# Patient Record
Sex: MALE | Race: WHITE | NOT HISPANIC OR LATINO | Employment: FULL TIME | URBAN - METROPOLITAN AREA
[De-identification: names, ages, dates, MRNs, and addresses within clinical notes are randomized per-mention and may not be internally consistent; named-entity substitution may affect disease eponyms.]

---

## 2018-01-18 ENCOUNTER — OFFICE VISIT - HEALTHEAST (OUTPATIENT)
Dept: FAMILY MEDICINE | Facility: CLINIC | Age: 43
End: 2018-01-18

## 2018-01-18 DIAGNOSIS — F41.1 GENERALIZED ANXIETY DISORDER: ICD-10-CM

## 2018-01-18 DIAGNOSIS — F41.0 PANIC ANXIETY SYNDROME: ICD-10-CM

## 2018-01-18 LAB
ANION GAP SERPL CALCULATED.3IONS-SCNC: 10 MMOL/L (ref 5–18)
BUN SERPL-MCNC: 18 MG/DL (ref 8–22)
CALCIUM SERPL-MCNC: 9.8 MG/DL (ref 8.5–10.5)
CHLORIDE BLD-SCNC: 105 MMOL/L (ref 98–107)
CO2 SERPL-SCNC: 26 MMOL/L (ref 22–31)
CREAT SERPL-MCNC: 0.9 MG/DL (ref 0.7–1.3)
GFR SERPL CREATININE-BSD FRML MDRD: >60 ML/MIN/1.73M2
GLUCOSE BLD-MCNC: 91 MG/DL (ref 70–125)
POTASSIUM BLD-SCNC: 4.4 MMOL/L (ref 3.5–5)
SODIUM SERPL-SCNC: 141 MMOL/L (ref 136–145)
TSH SERPL DL<=0.005 MIU/L-ACNC: 0.86 UIU/ML (ref 0.3–5)

## 2018-01-18 NOTE — ASSESSMENT & PLAN NOTE
This patient returns to clinic to discuss anxiety.  This is the first time he is ever discussed this health concern with a physician.  He does meet criteria for generalized anxiety disorder.  There are some features consistent with agoraphobia as well as panic attack syndrome.   -I am recommending both pharmacologic and psychologic therapy.  Start Lexapro with initial target dose of 10 mg.  Referral for cognitive behavioral therapy.  -Basic lab work, check thyroid.  -There is some situational anxiety that has features consistent with performance anxiety.  A trial of propranolol to be used as needed.  -No indication for benzodiazepines or antihistamines.  -Return to clinic in 4 weeks for follow-up visit to discuss improvement in side effects.

## 2018-01-25 ENCOUNTER — COMMUNICATION - HEALTHEAST (OUTPATIENT)
Dept: FAMILY MEDICINE | Facility: CLINIC | Age: 43
End: 2018-01-25

## 2018-02-06 ENCOUNTER — COMMUNICATION - HEALTHEAST (OUTPATIENT)
Dept: FAMILY MEDICINE | Facility: CLINIC | Age: 43
End: 2018-02-06

## 2018-03-07 ENCOUNTER — COMMUNICATION - HEALTHEAST (OUTPATIENT)
Dept: FAMILY MEDICINE | Facility: CLINIC | Age: 43
End: 2018-03-07

## 2018-03-26 ENCOUNTER — OFFICE VISIT - HEALTHEAST (OUTPATIENT)
Dept: FAMILY MEDICINE | Facility: CLINIC | Age: 43
End: 2018-03-26

## 2018-03-26 DIAGNOSIS — J30.2 SEASONAL ALLERGIES: ICD-10-CM

## 2018-03-26 DIAGNOSIS — F41.0 PANIC ANXIETY SYNDROME: ICD-10-CM

## 2018-03-26 DIAGNOSIS — F41.1 GENERALIZED ANXIETY DISORDER: ICD-10-CM

## 2018-03-26 NOTE — ASSESSMENT & PLAN NOTE
This patient returns for a 2 month visit after starting Lexapro in treatment of generalized anxiety disorder with some mild symptoms of panic and some mild symptoms agoraphobia.  Overall, the patient presents himself is doing quite well today.  He believes the medication has been effective.  He has expressed some side effects which she has been navigating and does not identify as being a reason to stop the medication.  -I did encourage the patient to consider psychotherapy if he is interested in increased treatment.  -Return to clinic in 6 months.

## 2018-04-30 ENCOUNTER — COMMUNICATION - HEALTHEAST (OUTPATIENT)
Dept: FAMILY MEDICINE | Facility: CLINIC | Age: 43
End: 2018-04-30

## 2018-09-17 ENCOUNTER — COMMUNICATION - HEALTHEAST (OUTPATIENT)
Dept: FAMILY MEDICINE | Facility: CLINIC | Age: 43
End: 2018-09-17

## 2018-09-26 ENCOUNTER — OFFICE VISIT - HEALTHEAST (OUTPATIENT)
Dept: FAMILY MEDICINE | Facility: CLINIC | Age: 43
End: 2018-09-26

## 2018-09-26 DIAGNOSIS — F41.0 PANIC ANXIETY SYNDROME: ICD-10-CM

## 2018-09-26 DIAGNOSIS — F41.1 GENERALIZED ANXIETY DISORDER: ICD-10-CM

## 2018-09-26 DIAGNOSIS — Z13.1 SCREENING FOR DIABETES MELLITUS: ICD-10-CM

## 2018-09-26 DIAGNOSIS — Z13.220 SCREENING FOR LIPID DISORDERS: ICD-10-CM

## 2018-09-26 NOTE — ASSESSMENT & PLAN NOTE
Controlled.  Reviewed the PHQ 9 and MAYUR 7 is shown in the flowsheet.  I recommend that the patient continue his current medication without change.  Propranolol was refilled for performance anxiety related to presentations at work.  Return to clinic in 6 months for annual exam and mental health follow-up.  Fasting laboratory tests were ordered in anticipation of this visit.

## 2019-09-09 ENCOUNTER — OFFICE VISIT - HEALTHEAST (OUTPATIENT)
Dept: FAMILY MEDICINE | Facility: CLINIC | Age: 44
End: 2019-09-09

## 2019-09-09 DIAGNOSIS — J30.2 SEASONAL ALLERGIES: ICD-10-CM

## 2019-09-09 DIAGNOSIS — Z00.00 ROUTINE GENERAL MEDICAL EXAMINATION AT A HEALTH CARE FACILITY: ICD-10-CM

## 2019-09-09 DIAGNOSIS — Z23 NEED FOR VACCINATION: ICD-10-CM

## 2019-09-09 DIAGNOSIS — F41.0 PANIC ANXIETY SYNDROME: ICD-10-CM

## 2019-09-09 DIAGNOSIS — F41.1 GENERALIZED ANXIETY DISORDER: ICD-10-CM

## 2019-09-09 ASSESSMENT — ANXIETY QUESTIONNAIRES
3. WORRYING TOO MUCH ABOUT DIFFERENT THINGS: SEVERAL DAYS
7. FEELING AFRAID AS IF SOMETHING AWFUL MIGHT HAPPEN: NOT AT ALL
4. TROUBLE RELAXING: SEVERAL DAYS
2. NOT BEING ABLE TO STOP OR CONTROL WORRYING: SEVERAL DAYS
GAD7 TOTAL SCORE: 5
5. BEING SO RESTLESS THAT IT IS HARD TO SIT STILL: NOT AT ALL
IF YOU CHECKED OFF ANY PROBLEMS ON THIS QUESTIONNAIRE, HOW DIFFICULT HAVE THESE PROBLEMS MADE IT FOR YOU TO DO YOUR WORK, TAKE CARE OF THINGS AT HOME, OR GET ALONG WITH OTHER PEOPLE: SOMEWHAT DIFFICULT
6. BECOMING EASILY ANNOYED OR IRRITABLE: SEVERAL DAYS
1. FEELING NERVOUS, ANXIOUS, OR ON EDGE: SEVERAL DAYS

## 2019-09-09 ASSESSMENT — PATIENT HEALTH QUESTIONNAIRE - PHQ9: SUM OF ALL RESPONSES TO PHQ QUESTIONS 1-9: 3

## 2019-09-09 ASSESSMENT — MIFFLIN-ST. JEOR: SCORE: 1919.32

## 2019-09-09 NOTE — ASSESSMENT & PLAN NOTE
This patient presents for annual exam without specific concerns other than medication check.  Sleep has been somewhat dysregulated recently.  Based on our conversation, this is likely multifactorial with contributing factors including marital strife, stress at work.  However, he does not associate this with increased anxiety.  - I have recommended fasting lab work.  Phenotypically, he describes some symptoms which could be consistent with metabolic syndrome.  We do not have a fasting cholesterol panel nor screening for diabetes.  - He is up-to-date with preventative health factors except for influenza vaccination which she is declining today.  -Return toclinic in 6 months for anxiety focus visit.

## 2019-09-09 NOTE — ASSESSMENT & PLAN NOTE
Currently well controlled.  Early awakening insomnia is unlikely to be a result of his Lexapro.  Continue exercise.  Discussed sleep hygiene.

## 2019-09-19 ENCOUNTER — AMBULATORY - HEALTHEAST (OUTPATIENT)
Dept: LAB | Facility: CLINIC | Age: 44
End: 2019-09-19

## 2019-09-19 DIAGNOSIS — Z00.00 ROUTINE GENERAL MEDICAL EXAMINATION AT A HEALTH CARE FACILITY: ICD-10-CM

## 2019-09-19 DIAGNOSIS — F41.1 GENERALIZED ANXIETY DISORDER: ICD-10-CM

## 2019-09-19 DIAGNOSIS — Z13.1 SCREENING FOR DIABETES MELLITUS: ICD-10-CM

## 2019-09-19 DIAGNOSIS — Z13.220 SCREENING FOR LIPID DISORDERS: ICD-10-CM

## 2019-09-19 LAB
ANION GAP SERPL CALCULATED.3IONS-SCNC: 9 MMOL/L (ref 5–18)
BUN SERPL-MCNC: 17 MG/DL (ref 8–22)
CALCIUM SERPL-MCNC: 9.6 MG/DL (ref 8.5–10.5)
CHLORIDE BLD-SCNC: 105 MMOL/L (ref 98–107)
CHOLEST SERPL-MCNC: 151 MG/DL
CO2 SERPL-SCNC: 27 MMOL/L (ref 22–31)
CREAT SERPL-MCNC: 0.95 MG/DL (ref 0.7–1.3)
FASTING STATUS PATIENT QL REPORTED: YES
GFR SERPL CREATININE-BSD FRML MDRD: >60 ML/MIN/1.73M2
GLUCOSE BLD-MCNC: 104 MG/DL (ref 70–125)
HDLC SERPL-MCNC: 54 MG/DL
LDLC SERPL CALC-MCNC: 85 MG/DL
POTASSIUM BLD-SCNC: 4.5 MMOL/L (ref 3.5–5)
SODIUM SERPL-SCNC: 141 MMOL/L (ref 136–145)
TRIGL SERPL-MCNC: 61 MG/DL

## 2020-03-09 ENCOUNTER — COMMUNICATION - HEALTHEAST (OUTPATIENT)
Dept: FAMILY MEDICINE | Facility: CLINIC | Age: 45
End: 2020-03-09

## 2020-03-09 DIAGNOSIS — F41.1 GENERALIZED ANXIETY DISORDER: ICD-10-CM

## 2020-06-13 ENCOUNTER — COMMUNICATION - HEALTHEAST (OUTPATIENT)
Dept: FAMILY MEDICINE | Facility: CLINIC | Age: 45
End: 2020-06-13

## 2020-06-13 DIAGNOSIS — F41.1 GENERALIZED ANXIETY DISORDER: ICD-10-CM

## 2020-09-18 ENCOUNTER — COMMUNICATION - HEALTHEAST (OUTPATIENT)
Dept: FAMILY MEDICINE | Facility: CLINIC | Age: 45
End: 2020-09-18

## 2020-09-18 DIAGNOSIS — F41.1 GENERALIZED ANXIETY DISORDER: ICD-10-CM

## 2020-12-21 ENCOUNTER — COMMUNICATION - HEALTHEAST (OUTPATIENT)
Dept: FAMILY MEDICINE | Facility: CLINIC | Age: 45
End: 2020-12-21

## 2020-12-21 DIAGNOSIS — F41.1 GENERALIZED ANXIETY DISORDER: ICD-10-CM

## 2021-03-22 ENCOUNTER — COMMUNICATION - HEALTHEAST (OUTPATIENT)
Dept: FAMILY MEDICINE | Facility: CLINIC | Age: 46
End: 2021-03-22

## 2021-03-22 DIAGNOSIS — F41.1 GENERALIZED ANXIETY DISORDER: ICD-10-CM

## 2021-03-25 ENCOUNTER — OFFICE VISIT - HEALTHEAST (OUTPATIENT)
Dept: FAMILY MEDICINE | Facility: CLINIC | Age: 46
End: 2021-03-25

## 2021-03-25 DIAGNOSIS — I71.20 THORACIC AORTIC ANEURYSM WITHOUT RUPTURE (H): ICD-10-CM

## 2021-03-25 DIAGNOSIS — Z13.220 SCREENING FOR LIPID DISORDERS: ICD-10-CM

## 2021-03-25 DIAGNOSIS — R73.01 ELEVATED FASTING GLUCOSE: ICD-10-CM

## 2021-03-25 DIAGNOSIS — Z00.00 ROUTINE GENERAL MEDICAL EXAMINATION AT A HEALTH CARE FACILITY: ICD-10-CM

## 2021-03-25 DIAGNOSIS — F41.1 GENERALIZED ANXIETY DISORDER: ICD-10-CM

## 2021-03-25 DIAGNOSIS — J30.2 SEASONAL ALLERGIES: ICD-10-CM

## 2021-03-25 LAB
CHOLEST SERPL-MCNC: 152 MG/DL
FASTING STATUS PATIENT QL REPORTED: YES
FASTING STATUS PATIENT QL REPORTED: YES
GLUCOSE BLD-MCNC: 81 MG/DL (ref 70–99)
HBA1C MFR BLD: 5.3 %
HDLC SERPL-MCNC: 49 MG/DL
LDLC SERPL CALC-MCNC: 77 MG/DL
TRIGL SERPL-MCNC: 129 MG/DL

## 2021-03-25 RX ORDER — LORATADINE PSEUDOEPHEDRINE SULFATE 10; 240 MG/1; MG/1
1 TABLET, EXTENDED RELEASE ORAL DAILY
Qty: 90 TABLET | Refills: 1 | Status: SHIPPED | OUTPATIENT
Start: 2021-03-25 | End: 2022-06-30

## 2021-03-25 RX ORDER — ESCITALOPRAM OXALATE 10 MG/1
10 TABLET ORAL DAILY
Qty: 90 TABLET | Refills: 3 | Status: SHIPPED | OUTPATIENT
Start: 2021-03-25 | End: 2022-06-16

## 2021-03-25 ASSESSMENT — PATIENT HEALTH QUESTIONNAIRE - PHQ9: SUM OF ALL RESPONSES TO PHQ QUESTIONS 1-9: 2

## 2021-03-25 ASSESSMENT — ANXIETY QUESTIONNAIRES
IF YOU CHECKED OFF ANY PROBLEMS ON THIS QUESTIONNAIRE, HOW DIFFICULT HAVE THESE PROBLEMS MADE IT FOR YOU TO DO YOUR WORK, TAKE CARE OF THINGS AT HOME, OR GET ALONG WITH OTHER PEOPLE: SOMEWHAT DIFFICULT
GAD7 TOTAL SCORE: 3
6. BECOMING EASILY ANNOYED OR IRRITABLE: SEVERAL DAYS
3. WORRYING TOO MUCH ABOUT DIFFERENT THINGS: SEVERAL DAYS
5. BEING SO RESTLESS THAT IT IS HARD TO SIT STILL: NOT AT ALL
7. FEELING AFRAID AS IF SOMETHING AWFUL MIGHT HAPPEN: NOT AT ALL
4. TROUBLE RELAXING: NOT AT ALL
1. FEELING NERVOUS, ANXIOUS, OR ON EDGE: SEVERAL DAYS
2. NOT BEING ABLE TO STOP OR CONTROL WORRYING: NOT AT ALL

## 2021-03-25 ASSESSMENT — MIFFLIN-ST. JEOR: SCORE: 1980.55

## 2021-03-25 NOTE — ASSESSMENT & PLAN NOTE
This patient is up-to-date with preventative health recommendations.  We will plan to check hepatitis C/HIV at a future visit.  Fasting lab work will be performed today.  No new/additional family history.

## 2021-03-25 NOTE — ASSESSMENT & PLAN NOTE
Overall, the patient is doing well.  No specific concerns as it relates to mental health/anxiety.  No side effects from his current medication.  He is working from home which has been a challenge.  Otherwise, status quo as relates to his medication plan.  Check basic metabolic panel today.

## 2021-03-25 NOTE — ASSESSMENT & PLAN NOTE
The patient presented to a local emergency department last week with acute chest pain and a sensation of it radiating through to the back.  The evaluation for aortic dissection showed a mild dilatation of the ascending thoracic aorta with some concern for aneurysm.  There is no comparison.  He otherwise feels well.  The chest pain that brought him to the emergency department ultimately seems to have been musculoskeletal in nature.  We discussed that we would need to evaluate for change with follow-up scan at some point in the future.  This will likely take place usingCT angiogram as this was the initial modality.  Blood pressures well controlled.  We discussed that he should avoid fluoroquinolones.  He does not seem to have genetic predisposition with a condition such as Marfan's or Shalonda-Danlos syndrome.  He is comfortable with surveillance strategy.  At this time, we will not refer him to a specialist for additional recommendations/evaluation.

## 2021-04-18 ENCOUNTER — COMMUNICATION - HEALTHEAST (OUTPATIENT)
Dept: FAMILY MEDICINE | Facility: CLINIC | Age: 46
End: 2021-04-18

## 2021-04-18 DIAGNOSIS — F41.1 GENERALIZED ANXIETY DISORDER: ICD-10-CM

## 2021-04-19 RX ORDER — PROPRANOLOL HYDROCHLORIDE 40 MG/1
40 TABLET ORAL PRN
Qty: 90 TABLET | Refills: 3 | Status: SHIPPED | OUTPATIENT
Start: 2021-04-19 | End: 2022-06-30

## 2021-05-26 ASSESSMENT — PATIENT HEALTH QUESTIONNAIRE - PHQ9: SUM OF ALL RESPONSES TO PHQ QUESTIONS 1-9: 3

## 2021-05-27 ASSESSMENT — PATIENT HEALTH QUESTIONNAIRE - PHQ9: SUM OF ALL RESPONSES TO PHQ QUESTIONS 1-9: 2

## 2021-05-28 ASSESSMENT — ANXIETY QUESTIONNAIRES
GAD7 TOTAL SCORE: 3
GAD7 TOTAL SCORE: 5

## 2021-05-31 VITALS — BODY MASS INDEX: 27.57 KG/M2 | WEIGHT: 209 LBS

## 2021-06-01 VITALS — WEIGHT: 210.7 LBS | BODY MASS INDEX: 27.8 KG/M2

## 2021-06-02 VITALS — BODY MASS INDEX: 28.63 KG/M2 | WEIGHT: 217 LBS

## 2021-06-03 VITALS
DIASTOLIC BLOOD PRESSURE: 70 MMHG | BODY MASS INDEX: 29.03 KG/M2 | TEMPERATURE: 98.6 F | RESPIRATION RATE: 16 BRPM | OXYGEN SATURATION: 98 % | WEIGHT: 219 LBS | HEART RATE: 70 BPM | HEIGHT: 73 IN | SYSTOLIC BLOOD PRESSURE: 126 MMHG

## 2021-06-05 VITALS
TEMPERATURE: 98.3 F | SYSTOLIC BLOOD PRESSURE: 130 MMHG | HEART RATE: 50 BPM | BODY MASS INDEX: 29.39 KG/M2 | OXYGEN SATURATION: 98 % | DIASTOLIC BLOOD PRESSURE: 74 MMHG | HEIGHT: 74 IN | WEIGHT: 229 LBS | RESPIRATION RATE: 18 BRPM

## 2021-06-06 NOTE — TELEPHONE ENCOUNTER
Refill Approved    Rx renewed per Medication Renewal Policy. Medication was last renewed on 9.9.19.    Kristal Dobbins, [facility] Triage/Med Refill 3/12/2020     Requested Prescriptions   Pending Prescriptions Disp Refills     escitalopram oxalate (LEXAPRO) 10 MG tablet [Pharmacy Med Name: ESCITALOPRAM 10 MG TABLET] 90 tablet 1     Sig: TAKE 1 TABLET BY MOUTH EVERY DAY       [facility] Refill Protocol  Passed - 3/9/2020  2:14 AM        Passed - PCP or prescribing provider visit in last year     Last office visit with prescriber/PCP: 9/26/2018 Sree Martin MD OR same dept: Visit date not found OR same specialty: 9/26/2018 Sree Martin MD  Last physical: 9/9/2019 Last MTM visit: Visit date not found   Next visit within 3 mo: Visit date not found  Next physical within 3 mo: Visit date not found  Prescriber OR PCP: Sree Martin MD  Last diagnosis associated with med order: 1. Generalized anxiety disorder  - escitalopram oxalate (LEXAPRO) 10 MG tablet [Pharmacy Med Name: ESCITALOPRAM 10 MG TABLET]; TAKE 1 TABLET BY MOUTH EVERY DAY  Dispense: 90 tablet; Refill: 1    If protocol passes may refill for 12 months if within 3 months of last provider visit (or a total of 15 months).

## 2021-06-08 NOTE — TELEPHONE ENCOUNTER
Refill Approved    Rx renewed per Medication Renewal Policy. Medication was last renewed on 3/12/20.    Natacha Griffin, [facility] Triage/Med Refill 6/15/2020     Requested Prescriptions   Pending Prescriptions Disp Refills     escitalopram oxalate (LEXAPRO) 10 MG tablet [Pharmacy Med Name: ESCITALOPRAM 10 MG TABLET] 90 tablet 0     Sig: TAKE 1 TABLET BY MOUTH EVERY DAY       [facility] Refill Protocol  Passed - 6/13/2020 12:15 AM        Passed - PCP or prescribing provider visit in last year     Last office visit with prescriber/PCP: 9/26/2018 Sree Martin MD OR same dept: Visit date not found OR same specialty: 9/26/2018 Sree Martin MD  Last physical: 9/9/2019 Last MTM visit: Visit date not found   Next visit within 3 mo: Visit date not found  Next physical within 3 mo: Visit date not found  Prescriber OR PCP: Sree Martin MD  Last diagnosis associated with med order: 1. Generalized anxiety disorder  - escitalopram oxalate (LEXAPRO) 10 MG tablet [Pharmacy Med Name: ESCITALOPRAM 10 MG TABLET]; TAKE 1 TABLET BY MOUTH EVERY DAY  Dispense: 90 tablet; Refill: 0    If protocol passes may refill for 12 months if within 3 months of last provider visit (or a total of 15 months).

## 2021-06-11 NOTE — TELEPHONE ENCOUNTER
Left message to call back for: Cordell   Information to relay to patient:  See message below and assist in scheduling for a physical when he calls back    Thank you,  Sindy Conteh LPN

## 2021-06-11 NOTE — TELEPHONE ENCOUNTER
Left message to call back for: Cordell   Information to relay to patient:  See message below and schedule an appointment when he calls back    Sindy Conteh LPN

## 2021-06-14 NOTE — TELEPHONE ENCOUNTER
RN cannot approve Refill Request    RN can NOT refill this medication Protocol failed and NO refill given. Last office visit: 9/26/2018 Sree Martin MD Last Physical: 9/9/2019 Last MTM visit: Visit date not found Last visit same specialty: 9/26/2018 Sree Martin MD.  Next visit within 3 mo: Visit date not found  Next physical within 3 mo: Visit date not found      Natacha Griffin, [facility] Connection Triage/Med Refill 12/23/2020    Requested Prescriptions   Pending Prescriptions Disp Refills     escitalopram oxalate (LEXAPRO) 10 MG tablet [Pharmacy Med Name: ESCITALOPRAM 10 MG TABLET] 90 tablet 0     Sig: TAKE 1 TABLET BY MOUTH EVERY DAY       [facility] Refill Protocol  Failed - 12/21/2020 12:12 AM        Failed - PCP or prescribing provider visit in last year     Last office visit with prescriber/PCP: 9/26/2018 Sree Martin MD OR same dept: Visit date not found OR same specialty: 9/26/2018 Sree Martin MD  Last physical: 9/9/2019 Last MTM visit: Visit date not found   Next visit within 3 mo: Visit date not found  Next physical within 3 mo: Visit date not found  Prescriber OR PCP: Sree Martin MD  Last diagnosis associated with med order: 1. Generalized anxiety disorder  - escitalopram oxalate (LEXAPRO) 10 MG tablet [Pharmacy Med Name: ESCITALOPRAM 10 MG TABLET]; TAKE 1 TABLET BY MOUTH EVERY DAY  Dispense: 90 tablet; Refill: 0    If protocol passes may refill for 12 months if within 3 months of last provider visit (or a total of 15 months).

## 2021-06-15 NOTE — PROGRESS NOTES
"Assessment/Plan:    Problem List Items Addressed This Visit     Generalized anxiety disorder - Primary     This patient returns to clinic to discuss anxiety.  This is the first time he is ever discussed this health concern with a physician.  He does meet criteria for generalized anxiety disorder.  There are some features consistent with agoraphobia as well as panic attack syndrome.   -I am recommending both pharmacologic and psychologic therapy.  Start Lexapro with initial target dose of 10 mg.  Referral for cognitive behavioral therapy.  -Basic lab work, check thyroid.  -There is some situational anxiety that has features consistent with performance anxiety.  A trial of propranolol to be used as needed.  -No indication for benzodiazepines or antihistamines.  -Return to clinic in 4 weeks for follow-up visit to discuss improvement in side effects.         Relevant Medications    escitalopram oxalate (LEXAPRO) 10 MG tablet    Other Relevant Orders    Thyroid Stimulating Hormone (TSH)    Basic Metabolic Panel    Ambulatory referral to Psychology    Panic anxiety syndrome    Relevant Medications    escitalopram oxalate (LEXAPRO) 10 MG tablet        Return in about 4 weeks (around 2/15/2018) for Anxiety.    Sree Martin MD  _______________________________    Chief Complaint   Patient presents with     Anxiety     Subjective: Cordell Ng is a 42 y.o. year old male who returns to clinic for the following chronic complaints/concerns:     Anxiety:   - started 10+ years ago.   - worsening.  Affecting life more.  He will avoid situations that cause anxiety.     - job:  at [employer]. Social job.  Worse on the job.  \"Mild to panic.\"  He will have sweats and tightness in chest as his anxiety increases.  \"Fight through it.\"  This makes it worse.     - he has tried natural \"calming\" things.  5-htp.  \"Not working.\"   - He perseverates about the process of traveling.   - people in his space cause problems.  " "Proximity.   - triggers are variable.     - he is frustrated by the lack of control.     - poor appetite.   - relationships: wife thinks that she has enabled him but doing simple tasks that he should do for himself.  Wife asked me to do this.\"  - has not been interacting with friends as much.      Review of systems is negative except for as shown in the HPI.    The following portions of the patient's history were reviewed and updated as appropriate: allergies, current medications, past family history, past medical history, past social history, past surgical history and problem list.    Objective:    weight is 209 lb (94.8 kg). His blood pressure is 138/80 and his pulse is 60.   General: No acute distress  Eyes: No conjunctival injection, no scleral icterus.  ENT: No submandibular anterior cervical lymphadenopathy.  No thyromegaly.  Next line cardiac: Regular rate and rhythm, normal S1/S2, no murmurs gallops  Respiratory: Clear to auscultation bilaterally.    Neurologic: Cranial nerves II through XII grossly intact.  Walks with normal gait.  Psych: Patient appears somewhat anxious.  Normal rate of speech.  No tangentiality.  No perseveration.  No suicidality.  Thinking is logical.  Neatly dressed.    No results found for this or any previous visit (from the past 24 hour(s)).    Additional History from Old Records Summarized (2): no  Decision to Obtain Records (1): no  Radiology Tests Summarized or Ordered (1): no  Labs Reviewed or Ordered (1): yes  Medicine Test Summarized or Ordered (1): no  Independent Review of EKG or X-RAY(2 each): no    This note has been dictated using voice recognition software. Any grammatical or context distortions are unintentional and inherent to the software  "

## 2021-06-16 PROBLEM — R73.01 ELEVATED FASTING GLUCOSE: Status: ACTIVE | Noted: 2019-09-24

## 2021-06-16 PROBLEM — F41.1 GENERALIZED ANXIETY DISORDER: Status: ACTIVE | Noted: 2018-01-18

## 2021-06-16 PROBLEM — Z00.00 ROUTINE GENERAL MEDICAL EXAMINATION AT A HEALTH CARE FACILITY: Status: ACTIVE | Noted: 2019-09-09

## 2021-06-16 PROBLEM — I71.20 THORACIC AORTIC ANEURYSM WITHOUT RUPTURE (H): Status: ACTIVE | Noted: 2021-03-25

## 2021-06-16 PROBLEM — F41.0 PANIC ANXIETY SYNDROME: Status: ACTIVE | Noted: 2018-01-18

## 2021-06-16 NOTE — PROGRESS NOTES
"Assessment/Plan:    Problem List Items Addressed This Visit     Generalized anxiety disorder - Primary     This patient returns for a 2 month visit after starting Lexapro in treatment of generalized anxiety disorder with some mild symptoms of panic and some mild symptoms agoraphobia.  Overall, the patient presents himself is doing quite well today.  He believes the medication has been effective.  He has expressed some side effects which she has been navigating and does not identify as being a reason to stop the medication.  -I did encourage the patient to consider psychotherapy if he is interested in increased treatment.  -Return to clinic in 6 months.         Relevant Medications    escitalopram oxalate (LEXAPRO) 10 MG tablet    Panic anxiety syndrome    Relevant Medications    escitalopram oxalate (LEXAPRO) 10 MG tablet      Other Visit Diagnoses     Seasonal allergies        Relevant Medications    loratadine-pseudoephedrine (CLARITIN-D 24 HOUR)  mg per 24 hr tablet        No Follow-up on file.    Sree Martin MD  _______________________________    Chief Complaint   Patient presents with     Anxiety     Medication Education Visit     pt would like to discuss RX for claritin D      Subjective: Cordell Ng is a 43 y.o. year old male who returns to clinic for the following chronic complaints/concerns:     Anxiety:   - he has seen improvement.   - more able to function.   - has not started therapy.   - \"improved.\"  Less agoraphobia symptoms.  Example: wife had surgery which required him to be more active on a day to today basis.     - some initial side effects that resolved.     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     - he believes his libido is a bit lower than before.    - he has not used the propranolol.  \"Stomach issues.\"      Review of systems is negative except for as shown in the HPI.    The following portions of the patient's history were reviewed and updated as appropriate: " allergies, current medications, past family history, past medical history, past social history, past surgical history and problem list.    Objective:    weight is 210 lb 11.2 oz (95.6 kg). His blood pressure is 136/86 and his pulse is 66.   General: No acute distress  Psych: Affect is somewhat flat.  Normal rate of speech.  No tangentiality.  Denies suicidality.  Thinking is logical.  Response time is normal.  Neatly dressed.    Reviewed the MAYUR 7 is shown in the flowsheet.    No results found for this or any previous visit (from the past 24 hour(s)).    Additional History from Old Records Summarized (2): no  Decision to Obtain Records (1): no  Radiology Tests Summarized or Ordered (1): no  Labs Reviewed or Ordered (1): no  Medicine Test Summarized or Ordered (1): no  Independent Review of EKG or X-RAY(2 each): no    This note has been dictated using voice recognition software. Any grammatical or context distortions are unintentional and inherent to the software

## 2021-06-16 NOTE — TELEPHONE ENCOUNTER
Refill Approved    Rx renewed per Medication Renewal Policy. Medication was last renewed on 3/25/21.    Turner Jose, [facility] Triage/Med Refill 4/19/2021     Requested Prescriptions   Pending Prescriptions Disp Refills     propranoloL (INDERAL) 40 MG tablet [Pharmacy Med Name: PROPRANOLOL 40 MG TABLET] 30 tablet 0     Sig: TAKE 1 TABLET (40 MG TOTAL) BY MOUTH AS NEEDED.       Beta-Blockers Refill Protocol Passed - 4/18/2021  9:31 AM        Passed - PCP or prescribing provider visit in past 12 months or next 3 months     Last office visit with prescriber/PCP: 9/26/2018 Sree Martin MD OR same dept: Visit date not found OR same specialty: 9/26/2018 Sree Martin MD  Last physical: 3/25/2021 Last MTM visit: Visit date not found   Next visit within 3 mo: Visit date not found  Next physical within 3 mo: Visit date not found  Prescriber OR PCP: Sree Martin MD  Last diagnosis associated with med order: 1. Generalized anxiety disorder  - propranoloL (INDERAL) 40 MG tablet [Pharmacy Med Name: PROPRANOLOL 40 MG TABLET]; Take 1 tablet (40 mg total) by mouth as needed.  Dispense: 30 tablet; Refill: 0    If protocol passes may refill for 12 months if within 3 months of last provider visit (or a total of 15 months).             Passed - Blood pressure filed in past 12 months     BP Readings from Last 1 Encounters:   03/25/21 130/74

## 2021-06-16 NOTE — TELEPHONE ENCOUNTER
RN cannot approve Refill Request    RN can NOT refill this medication PCP messaged that patient is overdue for Office Visit. Last office visit: 9/26/2018 Sree Martin MD Last Physical: 9/9/2019 Last MTM visit: Visit date not found Last visit same specialty: 9/26/2018 Sree Martin MD.  Next visit within 3 mo: Visit date not found  Next physical within 3 mo: Visit date not found      Vivian Sykes, [facility] Triage/Med Refill 3/22/2021    Requested Prescriptions   Pending Prescriptions Disp Refills     escitalopram oxalate (LEXAPRO) 10 MG tablet [Pharmacy Med Name: ESCITALOPRAM 10 MG TABLET] 90 tablet 0     Sig: TAKE 1 TABLET (10 MG TOTAL) BY MOUTH DAILY. CLINIC VISIT REQUIRED BEFORE ADDITIONAL REFILLS       [facility] Refill Protocol  Failed - 3/22/2021 12:21 AM        Failed - PCP or prescribing provider visit in last year     Last office visit with prescriber/PCP: 9/26/2018 Sree Martin MD OR same dept: Visit date not found OR same specialty: 9/26/2018 Sree Martin MD  Last physical: 9/9/2019 Last MTM visit: Visit date not found   Next visit within 3 mo: Visit date not found  Next physical within 3 mo: Visit date not found  Prescriber OR PCP: Sree Martin MD  Last diagnosis associated with med order: 1. Generalized anxiety disorder  - escitalopram oxalate (LEXAPRO) 10 MG tablet [Pharmacy Med Name: ESCITALOPRAM 10 MG TABLET]; Take 1 tablet (10 mg total) by mouth daily. Clinic visit required before additional refills  Dispense: 90 tablet; Refill: 0    If protocol passes may refill for 12 months if within 3 months of last provider visit (or a total of 15 months).

## 2021-06-17 NOTE — PATIENT INSTRUCTIONS - HE
Patient Instructions by Sree Martin MD at 9/9/2019  1:40 PM     Author: rSee Martin MD Service: -- Author Type: Physician    Filed: 9/9/2019  2:21 PM Encounter Date: 9/9/2019 Status: Addendum    : Sree Martin MD (Physician)    Related Notes: Original Note by Sree Martin MD (Physician) filed at 9/9/2019  1:58 PM        - Google: sleep hygiene (American Sleep Association)   - Try Melatonin  (5 mg 1-2 hours before bedtime)   - mindfulness.   - Cognitive Behavioral Therapy for Insomnia.     Bear Walden, PhD   - Why We Sleep    Sleep Hygiene Tips - Research & Treatments  Getting good sleep is important in maintaining health. There are several things that you can do to promote good sleep and sleep hygiene, and ultimately Get Better Sleep.  What is sleep hygiene?  Sleep hygiene is defined as behaviors that one can do to help promote good sleep using behavioral interventions.  Sleep hygiene tips:  Maintain a regular sleep routine  Go to bed at the same time. Wake up at the same time. Ideally, your schedule will remain the same (+/- 20 minutes) every night of the week.  Avoid naps if possible  Naps decrease the Sleep Debt that is so necessary for easy sleep onset.   Each of us needs a certain amount of sleep per 24-hour period. We need that amount, and we dont need more than that.   When we take naps, it decreases the amount of sleep that we need the next night - which may cause sleep fragmentation and difficulty initiating sleep, and may lead to insomnia.  Dont stay in bed awake for more than 5-10 minutes.  If you find your mind racing, or worrying about not being able to sleep during the middle of the night, get out of bed, and sit in a chair in the dark. Do your mind racing in the chair until you are sleepy, then return to bed. No TV or internet during these periods! That will just stimulate you more than desired.  If this happens several times during the night, that is OK. Just  maintain your regular wake time, and try to avoid naps.  Dont watch TV or read in bed.  When you watch TV or read in bed, you associate the bed with wakefulness.   The bed is reserved for two things - sleep and hanky panky.  Drink caffeinated drinks with caution  The effects of caffeine may last for several hours after ingestion. Caffeine can fragment sleep, and cause difficulty initiating sleep. If you drink caffeine, use it only before noon.   Remember that soda and tea contain caffeine as well.     Avoid inappropriate substances that interfere with sleep  Cigarettes, alcohol, and over-the-counter medications may cause fragmented sleep.  Exercise regularly  Exercise before 2 pm every day. Exercise promotes continuous sleep.   Avoid rigorous exercise before bedtime. Rigorous exercise circulates endorphins into the body which may cause difficulty initiating sleep.   exercise and sleep  Have a quiet, comfortable bedroom  Set your bedroom thermostat at a comfortable temperature. Generally, a little cooler is better than a little warmer.   Turn off the TV and other extraneous noise that may disrupt sleep. Background white noise like a fan is OK.   If your pets awaken you, keep them outside the bedroom.   Your bedroom should be dark. Turn off bright lights.   Have a comfortable mattress.  If you are a clock watcher at night, hide the clock.    Have a comfortable pre-bedtime routine  A warm bath, shower   Meditation, or quiet time  Some who are struggling with sleep regularly find it helpful to print out these recommendations and read them regularly. If you accidentally miss some of recommendations, or have a bad night, do not fret. By following these sleep hygiene recommendations, you will help yourself to get into a routine that promotes good sleep opportunities.

## 2021-06-20 NOTE — PROGRESS NOTES
"Assessment/Plan:    Problem List Items Addressed This Visit     Generalized anxiety disorder - Primary     Controlled.  Reviewed the PHQ 9 and MAYUR 7 is shown in the flowsheet.  I recommend that the patient continue his current medication without change.  Propranolol was refilled for performance anxiety related to presentations at work.  Return to clinic in 6 months for annual exam and mental health follow-up.  Fasting laboratory tests were ordered in anticipation of this visit.         Relevant Medications    propranolol (INDERAL) 40 MG tablet    escitalopram oxalate (LEXAPRO) 10 MG tablet    Panic anxiety syndrome    Relevant Medications    escitalopram oxalate (LEXAPRO) 10 MG tablet      Other Visit Diagnoses     Screening for lipid disorders        Relevant Orders    Lipid Profile    Screening for diabetes mellitus        Relevant Orders    Glucose          No Follow-up on file.    Sree Martin MD  _______________________________    Chief Complaint   Patient presents with     Follow-up     anxiety      Subjective: Cordell Ng is a 43 y.o. year old male who returns to clinic for the following chronic complaints/concerns:     Anxiety:   - no dramatic changes   - propranolol helps with presentations    - on a day-to-day basis he is doing well.   - he tends to continue to avoid crowds.  Symptoms of agoraphobia have improved.   - no side effects from lexapro.  \"Odd feeling\" has resolved.  Sleep remains variable.     -he works out regularly.   - no sexual side effects (delayed ejaculation has resolved).    Review of systems is negative except for as shown in the HPI.    The following portions of the patient's history were reviewed and updated as appropriate: allergies, current medications, past medical history, past social history and problem list.    Objective:    weight is 217 lb (98.4 kg). His blood pressure is 126/80 and his pulse is 54 (abnormal).   Gen: nad  Psych: normal affect.      No results found for " this or any previous visit (from the past 24 hour(s)).    Additional History from Old Records Summarized (2): no  Decision to Obtain Records (1): no  Radiology Tests Summarized or Ordered (1): no  Labs Reviewed or Ordered (1): no  Medicine Test Summarized or Ordered (1): no  Independent Review of EKG or X-RAY(2 each): no    This note has been dictated using voice recognition software. Any grammatical or context distortions are unintentional and inherent to the software

## 2021-06-27 ENCOUNTER — HEALTH MAINTENANCE LETTER (OUTPATIENT)
Age: 46
End: 2021-06-27

## 2021-06-28 NOTE — PROGRESS NOTES
Progress Notes by Sree Martin MD at 9/9/2019  1:40 PM     Author: Sree Martin MD Service: -- Author Type: Physician    Filed: 9/9/2019  3:56 PM Encounter Date: 9/9/2019 Status: Signed    : Sree Martin MD (Physician)       MALE PREVENTATIVE EXAM    Assessment and Plan:       Problem List Items Addressed This Visit     Generalized anxiety disorder     Currently well controlled.  Early awakening insomnia is unlikely to be a result of his Lexapro.  Continue exercise.  Discussed sleep hygiene.         Relevant Medications    escitalopram oxalate (LEXAPRO) 10 MG tablet    Other Relevant Orders    Basic Metabolic Panel    Panic anxiety syndrome    Relevant Medications    escitalopram oxalate (LEXAPRO) 10 MG tablet    Routine general medical examination at a health care facility - Primary     This patient presents for annual exam without specific concerns other than medication check.  Sleep has been somewhat dysregulated recently.  Based on our conversation, this is likely multifactorial with contributing factors including marital strife, stress at work.  However, he does not associate this with increased anxiety.  - I have recommended fasting lab work.  Phenotypically, he describes some symptoms which could be consistent with metabolic syndrome.  We do not have a fasting cholesterol panel nor screening for diabetes.  - He is up-to-date with preventative health factors except for influenza vaccination which she is declining today.  -Return to clinic in 6 months for anxiety focus visit.         Relevant Medications    loratadine-pseudoephedrine (CLARITIN-D 24 HOUR)  mg per 24 hr tablet    Other Relevant Orders    Basic Metabolic Panel    BMI 29.0-29.9,adult      Other Visit Diagnoses     Need for vaccination        Relevant Orders    Tdap vaccine,  6yo or older,  IM (Completed)    Seasonal allergies        Relevant Medications    loratadine-pseudoephedrine (CLARITIN-D 24 HOUR)  mg per  24 hr tablet        Next follow up:  Return in about 6 months (around 3/9/2020) for Anxiety - virtual visit or phone call.    Immunization Review  Adult Imm Review: Missing doses of TDAP   Pt does not use tobacco products   I discussed the following with the patient:   Adult Healthy Living: Importance of regular exercise  Healthy nutrition  Getting adequate sleep  Stress management    I have had an Advance Directives discussion with the patient.    Subjective:   Chief Complaint: Cordell Ng is an 44 y.o. male here for a preventative health visit.     HPI:      Sleep: frequent wakening. More tossing and turning.  Most nights.  He does not think that this is related to lexapro.  Anxiety has been stable.  More situational scenarios.  Ongoing maritial strife.  He lays in bed and tosses/turns.  He does not get up.  He does fall back asleep.  Early in night is affected.  No alcohol.      Healthy Habits  Are you taking a daily aspirin? No  Do you typically exercising at least 40 min, 3-4 times per week?  Yes (free weights and cardio).  Do you usually eat at least 4 servings of fruit and vegetables a day, include whole grains and fiber and avoid regularly eating high fat foods? NO.  Mix of foods.  Red meat twice per week.  Other forms of protein.  Lots of veggies.  Breads including white bread.  He eats three full meals and snacks. He grazes.    Have you had an eye exam in the past two years? Yes  Do you see a dentist twice per year? Yes  Do you have any concerns regarding sleep? YES    Safety Screen  If you own firearms, are they secured in a locked gun cabinet or with trigger locks? Yes  Do you feel you are safe where you are living?: Yes (9/9/2019  1:46 PM)  Do you feel you are safe in your relationship(s)?: Yes (9/9/2019  1:46 PM)      Review of Systems:  Please see above.  The rest of the review of systems are negative for all systems.     Cancer Screening     Patient has no health maintenance due at this time     "    Patient Care Team:  Sree Martin MD as PCP - General (Family Medicine)  Sree Martin MD as Assigned PCP    History     Reviewed By Date/Time Sections Reviewed    Sree Martin MD 9/9/2019  1:54 PM Medical, Surgical    Sindy Conteh, LPN 9/9/2019  1:43 PM Surgical, Family    Sindy Conteh, LPN 9/9/2019  1:42 PM Surgical    AbdirahmanSindy bai, LPN 9/9/2019  1:41 PM Tobacco, Alcohol, Drug Use, Sexual Activity        Objective:   Vital Signs:   Visit Vitals  /70 (Patient Site: Left Arm, Patient Position: Sitting, Cuff Size: Adult Large)   Pulse 70   Temp 98.6  F (37  C) (Oral)   Resp 16   Ht 6' 0.5\" (1.842 m)   Wt 219 lb (99.3 kg)   SpO2 98% Comment: room air   BMI 29.29 kg/m      PHYSICAL EXAM  Physical Exam   Constitutional: He is oriented to person, place, and time. He appears well-developed. No distress.   HENT:   Head: Normocephalic and atraumatic.   Right Ear: External ear normal.   Left Ear: External ear normal.   Nose: Nose normal.   Mouth/Throat: Oropharynx is clear and moist. No oropharyngeal exudate.   Eyes: Pupils are equal, round, and reactive to light. Conjunctivae and EOM are normal. Right eye exhibits no discharge. Left eye exhibits no discharge. No scleral icterus.   Neck: Normal range of motion. No thyromegaly present.   Cardiovascular: Normal rate, regular rhythm, normal heart sounds and intact distal pulses. Exam reveals no gallop and no friction rub.   No murmur heard.  Pulmonary/Chest: Effort normal and breath sounds normal. No respiratory distress. He has no wheezes.   Abdominal: Soft. He exhibits no distension and no mass. There is no tenderness.   Musculoskeletal: Normal range of motion. He exhibits no edema.   Lymphadenopathy:     He has no cervical adenopathy.   Neurological: He is alert and oriented to person, place, and time. He has normal reflexes. No cranial nerve deficit. He exhibits normal muscle tone.   Skin: Skin is warm. "   Psychiatric: He has a normal mood and affect. His behavior is normal. Judgment and thought content normal.   Vitals reviewed.    The ASCVD Risk score (Wayzata GHADA Jr., et al., 2013) failed to calculate for the following reasons:    Cannot find a previous HDL lab    Cannot find a previous total cholesterol lab         Medication List           Accurate as of 9/9/19  3:56 PM. If you have any questions, ask your nurse or doctor.               CONTINUE taking these medications    escitalopram oxalate 10 MG tablet  Also known as:  LEXAPRO  INSTRUCTIONS:  Take 1 tablet (10 mg total) by mouth daily.        loratadine-pseudoephedrine  mg per 24 hr tablet  Also known as:  CLARITIN-D 24 HOUR  INSTRUCTIONS:  Take 1 tablet by mouth daily.        propranolol 40 MG tablet  Also known as:  INDERAL  INSTRUCTIONS:  Take 1 tablet (40 mg total) by mouth as needed.              Where to Get Your Medications      These medications were sent to [place] [id number] [place], [place] - [address] [facility]  [address] [facility] [id number]    Phone:  [phone]     escitalopram oxalate 10 MG tablet    loratadine-pseudoephedrine  mg per 24 hr tablet         Additional Screenings Completed Today:

## 2021-06-30 NOTE — PROGRESS NOTES
Progress Notes by Sree Martin MD at 3/25/2021 10:20 AM     Author: Sree Martin MD Service: -- Author Type: Physician    Filed: 3/25/2021  4:17 PM Encounter Date: 3/25/2021 Status: Signed    : Sree Martin MD (Physician)       MALE PREVENTATIVE EXAM    Assessment and Plan:     Patient has been advised of split billing requirements and indicates understanding: Yes    Thoracic aortic aneurysm without rupture (H)  The patient presented to a local emergency department last week with acute chest pain and a sensation of it radiating through to the back.  The evaluation for aortic dissection showed a mild dilatation of the ascending thoracic aorta with some concern for aneurysm.  There is no comparison.  He otherwise feels well.  The chest pain that brought him to the emergency department ultimately seems to have been musculoskeletal in nature.  We discussed that we would need to evaluate for change with follow-up scan at some point in the future.  This will likely take place using CT angiogram as this was the initial modality.  Blood pressures well controlled.  We discussed that he should avoid fluoroquinolones.  He does not seem to have genetic predisposition with a condition such as Marfan's or Shalonda-Danlos syndrome.  He is comfortable with surveillance strategy.  At this time, we will not refer him to a specialist for additional recommendations/evaluation.    Generalized anxiety disorder  Overall, the patient is doing well.  No specific concerns as it relates to mental health/anxiety.  No side effects from his current medication.  He is working from home which has been a challenge.  Otherwise, status quo as relates to his medication plan.  Check basic metabolic panel today.    Routine general medical examination at a health care facility  This patient is up-to-date with preventative health recommendations.  We will plan to check hepatitis C/HIV at a future visit.  Fasting lab work will be  performed today.  No new/additional family history.    Next follow up:  Return in about 1 year (around 3/25/2022) for Annual physical.    Immunization Review  Adult Imm Review: No immunizations due today  Pt does not use tobacco products   I discussed the following with the patient:   Adult Healthy Living: Importance of regular exercise  Healthy nutrition  Getting adequate sleep  Stress management  Herbal medications/alternative medical therapies    I have had an Advance Directives discussion with the patient.    Subjective:   Chief Complaint: Cordell Ng is an 46 y.o. male here for a preventative health visit.    Patient has been advised of split billing requirements and indicates understanding: Yes    HPI:  Episode of chest pain after work out that involved weights and treadmill.  Pain started while on his back with some radiation. ED visit without clear cause.  He felt poor at the time.  WBC count was high.  He also had tingling in his finger. He does not associate this with anxiety or panic. He had traveled prior to the episode.     Healthy Habits  Are you taking a daily aspirin? No  Do you typically exercising at least 40 min, 3-4 times per week?  Yes  Do you usually eat at least 4 servings of fruit and vegetables a day, include whole grains and fiber and avoid regularly eating high fat foods? Yes  Have you had an eye exam in the past two years? Yes  Do you see a dentist twice per year? Yes  Do you have any concerns regarding sleep? No    Safety Screen  If you own firearms, are they secured in a locked gun cabinet or with trigger locks? Yes  No data recorded    Review of Systems:  Please see above.  The rest of the review of systems are negative for all systems.     Cancer Screening     Patient has no health maintenance due at this time          Patient Care Team:  Sree Martin MD as PCP - General (Family Medicine)  Sree Martin MD as Assigned PCP        History     Reviewed By Date/Time Sections  "Reviewed    AbdirahmanSindy bai, LPN 3/25/2021 10:49 AM Tobacco, Alcohol, Drug Use, Sexual Activity    Sindy Conteh, LPN 3/25/2021 10:47 AM Tobacco, Family    Sindy Conteh, LPN 3/25/2021 10:46 AM Surgical, Tobacco, Family            Objective:   Vital Signs:   Visit Vitals  /74 (Patient Site: Left Arm, Patient Position: Sitting, Cuff Size: Adult Large)   Pulse (!) 50   Temp 98.3  F (36.8  C) (Oral)   Resp 18   Ht 6' 1.5\" (1.867 m) Comment: with shoes   Wt (!) 229 lb (103.9 kg)   SpO2 98% Comment: room air   BMI 29.80 kg/m        PHYSICAL EXAM  Physical Exam  Vitals signs reviewed.   Constitutional:       General: He is not in acute distress.     Appearance: He is well-developed.   HENT:      Head: Normocephalic and atraumatic.      Right Ear: External ear normal.      Left Ear: External ear normal.      Nose: Nose normal.      Mouth/Throat:      Pharynx: No oropharyngeal exudate.   Eyes:      General: No scleral icterus.        Right eye: No discharge.         Left eye: No discharge.      Conjunctiva/sclera: Conjunctivae normal.      Pupils: Pupils are equal, round, and reactive to light.   Neck:      Musculoskeletal: Normal range of motion.      Thyroid: No thyromegaly.   Cardiovascular:      Rate and Rhythm: Normal rate and regular rhythm.      Heart sounds: Normal heart sounds. No murmur. No friction rub. No gallop.    Pulmonary:      Effort: Pulmonary effort is normal. No respiratory distress.      Breath sounds: Normal breath sounds. No wheezing.   Abdominal:      General: There is no distension.      Palpations: Abdomen is soft. There is no mass.      Tenderness: There is no abdominal tenderness.   Musculoskeletal: Normal range of motion.   Lymphadenopathy:      Cervical: No cervical adenopathy.   Skin:     General: Skin is warm.   Neurological:      Mental Status: He is alert and oriented to person, place, and time.      Cranial Nerves: No cranial nerve deficit.      Motor: No " abnormal muscle tone.      Deep Tendon Reflexes: Reflexes are normal and symmetric.   Psychiatric:         Behavior: Behavior normal.         Thought Content: Thought content normal.         Judgment: Judgment normal.           The 10-year ASCVD risk score (Kacy URRUTIA Jr., et al., 2013) is: 1.3%    Values used to calculate the score:      Age: 46 years      Sex: Male      Is Non- : No      Diabetic: No      Tobacco smoker: No      Systolic Blood Pressure: 130 mmHg      Is BP treated: No      HDL Cholesterol: 54 mg/dL      Total Cholesterol: 151 mg/dL         Medication List          Accurate as of March 25, 2021  4:17 PM. If you have any questions, ask your nurse or doctor.            CHANGE how you take these medications    escitalopram oxalate 10 MG tablet  Also known as: LEXAPRO  INSTRUCTIONS: Take 1 tablet (10 mg total) by mouth daily.  What changed: additional instructions  Changed by: Sree Martin MD           CONTINUE taking these medications    Claritin-D 24 Hour  mg per 24 hr tablet  INSTRUCTIONS: Take 1 tablet by mouth daily.  Generic drug: loratadine-pseudoephedrine        propranoloL 40 MG tablet  Also known as: INDERAL  INSTRUCTIONS: Take 1 tablet (40 mg total) by mouth as needed.              Where to Get Your Medications      These medications were sent to [facility] [id number] [place], [place] - [address] [facility]  [address] [facility] [address]    Phone: [phone]     Claritin-D 24 Hour  mg per 24 hr tablet    escitalopram oxalate 10 MG tablet    propranoloL 40 MG tablet         Additional Screenings Completed Today:

## 2021-10-16 ENCOUNTER — HEALTH MAINTENANCE LETTER (OUTPATIENT)
Age: 46
End: 2021-10-16

## 2022-05-28 ENCOUNTER — HEALTH MAINTENANCE LETTER (OUTPATIENT)
Age: 47
End: 2022-05-28

## 2022-06-29 ASSESSMENT — ENCOUNTER SYMPTOMS
PARESTHESIAS: 0
HEADACHES: 0
HEMATOCHEZIA: 0
DIZZINESS: 0
MYALGIAS: 0
SHORTNESS OF BREATH: 0
DYSURIA: 0
PALPITATIONS: 0
ARTHRALGIAS: 0
HEMATURIA: 0
NERVOUS/ANXIOUS: 0
ABDOMINAL PAIN: 0
HEARTBURN: 0
FEVER: 0
SORE THROAT: 0
DIARRHEA: 0
CONSTIPATION: 0
COUGH: 0
CHILLS: 0
FREQUENCY: 0
JOINT SWELLING: 0
NAUSEA: 0
EYE PAIN: 0
WEAKNESS: 0

## 2022-06-30 ENCOUNTER — OFFICE VISIT (OUTPATIENT)
Dept: FAMILY MEDICINE | Facility: CLINIC | Age: 47
End: 2022-06-30
Payer: COMMERCIAL

## 2022-06-30 VITALS
SYSTOLIC BLOOD PRESSURE: 136 MMHG | HEIGHT: 73 IN | WEIGHT: 233.06 LBS | HEART RATE: 60 BPM | DIASTOLIC BLOOD PRESSURE: 92 MMHG | RESPIRATION RATE: 16 BRPM | BODY MASS INDEX: 30.89 KG/M2 | TEMPERATURE: 98.3 F

## 2022-06-30 DIAGNOSIS — Z00.00 ROUTINE GENERAL MEDICAL EXAMINATION AT A HEALTH CARE FACILITY: Primary | ICD-10-CM

## 2022-06-30 DIAGNOSIS — Z12.11 SCREEN FOR COLON CANCER: ICD-10-CM

## 2022-06-30 DIAGNOSIS — I71.20 THORACIC AORTIC ANEURYSM WITHOUT RUPTURE (H): ICD-10-CM

## 2022-06-30 DIAGNOSIS — F41.1 GENERALIZED ANXIETY DISORDER: ICD-10-CM

## 2022-06-30 DIAGNOSIS — R73.01 ELEVATED FASTING GLUCOSE: ICD-10-CM

## 2022-06-30 DIAGNOSIS — J30.2 SEASONAL ALLERGIES: ICD-10-CM

## 2022-06-30 PROBLEM — E66.09 CLASS 1 OBESITY DUE TO EXCESS CALORIES WITH SERIOUS COMORBIDITY AND BODY MASS INDEX (BMI) OF 31.0 TO 31.9 IN ADULT: Status: ACTIVE | Noted: 2019-09-09

## 2022-06-30 PROBLEM — E66.811 CLASS 1 OBESITY DUE TO EXCESS CALORIES WITH SERIOUS COMORBIDITY AND BODY MASS INDEX (BMI) OF 31.0 TO 31.9 IN ADULT: Status: ACTIVE | Noted: 2019-09-09

## 2022-06-30 LAB
ALT SERPL W P-5'-P-CCNC: 35 U/L (ref 10–50)
ANION GAP SERPL CALCULATED.3IONS-SCNC: 11 MMOL/L (ref 7–15)
BUN SERPL-MCNC: 14 MG/DL (ref 6–20)
CALCIUM SERPL-MCNC: 9.4 MG/DL (ref 8.6–10)
CHLORIDE SERPL-SCNC: 103 MMOL/L (ref 98–107)
CREAT SERPL-MCNC: 0.98 MG/DL (ref 0.67–1.17)
DEPRECATED HCO3 PLAS-SCNC: 24 MMOL/L (ref 22–29)
GFR SERPL CREATININE-BSD FRML MDRD: >90 ML/MIN/1.73M2
GLUCOSE SERPL-MCNC: 94 MG/DL (ref 70–99)
HBA1C MFR BLD: 5.5 % (ref 0–5.6)
INSULIN SERPL-ACNC: 5.1 UU/ML (ref 2.6–24.9)
POTASSIUM SERPL-SCNC: 4.4 MMOL/L (ref 3.4–5.3)
SODIUM SERPL-SCNC: 138 MMOL/L (ref 136–145)

## 2022-06-30 PROCEDURE — 84460 ALANINE AMINO (ALT) (SGPT): CPT | Performed by: FAMILY MEDICINE

## 2022-06-30 PROCEDURE — 36415 COLL VENOUS BLD VENIPUNCTURE: CPT | Performed by: FAMILY MEDICINE

## 2022-06-30 PROCEDURE — 99396 PREV VISIT EST AGE 40-64: CPT | Performed by: FAMILY MEDICINE

## 2022-06-30 PROCEDURE — 83036 HEMOGLOBIN GLYCOSYLATED A1C: CPT | Performed by: FAMILY MEDICINE

## 2022-06-30 PROCEDURE — 80048 BASIC METABOLIC PNL TOTAL CA: CPT | Performed by: FAMILY MEDICINE

## 2022-06-30 PROCEDURE — 83525 ASSAY OF INSULIN: CPT | Performed by: FAMILY MEDICINE

## 2022-06-30 RX ORDER — LORATADINE PSEUDOEPHEDRINE SULFATE 10; 240 MG/1; MG/1
1 TABLET, EXTENDED RELEASE ORAL DAILY
Qty: 90 TABLET | Refills: 3 | Status: SHIPPED | OUTPATIENT
Start: 2022-06-30 | End: 2023-06-14

## 2022-06-30 ASSESSMENT — ENCOUNTER SYMPTOMS
COUGH: 0
NERVOUS/ANXIOUS: 0
SORE THROAT: 0
JOINT SWELLING: 0
FREQUENCY: 0
MYALGIAS: 0
PARESTHESIAS: 0
SHORTNESS OF BREATH: 0
DIZZINESS: 0
HEADACHES: 0
DIARRHEA: 0
HEMATOCHEZIA: 0
EYE PAIN: 0
FEVER: 0
WEAKNESS: 0
CONSTIPATION: 0
ARTHRALGIAS: 0
CONSTITUTIONAL NEGATIVE: 1
HEARTBURN: 0
CHILLS: 0
NAUSEA: 0
DYSURIA: 0
ABDOMINAL PAIN: 0
PALPITATIONS: 0
HEMATURIA: 0

## 2022-06-30 NOTE — PATIENT INSTRUCTIONS
A Pesco-Mediterranean Diet With Intermittent Fasting: JACC Review Topic of the Week (https://www.jacc.org/doi/full/10.1016/j.jacc.2020.07.049)

## 2022-06-30 NOTE — ASSESSMENT & PLAN NOTE
Muscular build with central adiposity.  Check fasting labs today.  Discussed metabolic syndrome framework.  Continue exercise regimen.  Consider reducing fructose burden.

## 2022-06-30 NOTE — ASSESSMENT & PLAN NOTE
Annual exam.  No specific concerns.  Working to add interval training to current exercise regimen.  Central adiposity.  He has had at least 1 measurement of elevated fasting glucose levels.  Given weight gain we will evaluate metabolic health with fasting glucose, fasting lipids and fasting insulin (we will plan to calculate Agueda-IR).  Overall doing well.  Up-to-date with preventative health recommendations.

## 2022-06-30 NOTE — PROGRESS NOTES
"SUBJECTIVE:   CC: Cordell Ng is an 47 year old male who presents for preventative health visit.     Patient has been advised of split billing requirements and indicates understanding: Yes  Lifestyle:    - exercises regularly.  4-5x/week.  Resistance and treadmill.  He feels fit.  More intensity.     - nutrition: small meals throughout the day. \"many times per day.\"  Less steak than before.  Lots of veggies.  Lots of fruit (bananas and apples).  Breakfast is protein and bar.  Weekends is a meal.     Healthy Habits:     Getting at least 3 servings of Calcium per day:  Yes    Bi-annual eye exam:  Yes    Dental care twice a year:  Yes    Sleep apnea or symptoms of sleep apnea:  None    Diet:  Regular (no restrictions)    Frequency of exercise:  4-5 days/week    Duration of exercise:  45-60 minutes    Taking medications regularly:  Yes    Medication side effects:  None    PHQ-2 Total Score: 0    Additional concerns today:  No    Today's PHQ-2 Score:   PHQ-2 ( 1999 Pfizer) 6/30/2022   Q1: Little interest or pleasure in doing things 0   Q2: Feeling down, depressed or hopeless 0   PHQ-2 Score 0   Q1: Little interest or pleasure in doing things -   Q2: Feeling down, depressed or hopeless -   PHQ-2 Score -     Abuse: Current or Past(Physical, Sexual or Emotional)- No  Do you feel safe in your environment? Yes    Social History     Tobacco Use     Smoking status: Never Smoker     Smokeless tobacco: Former User   Substance Use Topics     Alcohol use: Yes     Alcohol/week: 6.0 standard drinks     Alcohol Use 6/29/2022   Prescreen: >3 drinks/day or >7 drinks/week? No       Last PSA: No results found for: PSA    Reviewed orders with patient. Reviewed health maintenance and updated orders accordingly - Yes    Reviewed and updated as needed this visit by clinical staff   Tobacco  Allergies  Meds  Problems    [facility] Hx            Reviewed and updated as needed this visit by Provider      Problems    [facility] Hx             Review " "of Systems   Constitutional: Negative.  Negative for chills and fever.   HENT: Negative for congestion, ear pain, hearing loss and sore throat.    Eyes: Negative for pain and visual disturbance.   Respiratory: Negative for cough and shortness of breath.    Cardiovascular: Negative for chest pain, palpitations and peripheral edema.   Gastrointestinal: Negative for abdominal pain, constipation, diarrhea, heartburn, hematochezia and nausea.   Genitourinary: Negative for dysuria, frequency, genital sores, hematuria, impotence, penile discharge and urgency.   Musculoskeletal: Negative for arthralgias, joint swelling and myalgias.   Skin: Negative for rash.   Neurological: Negative for dizziness, weakness, headaches and paresthesias.   Psychiatric/Behavioral: Negative for mood changes. The patient is not nervous/anxious.    All other systems reviewed and are negative.      OBJECTIVE:   BP (!) 136/92 (BP Location: Left arm, Patient Position: Sitting, Cuff Size: Adult Large)   Pulse 60   Temp 98.3  F (36.8  C) (Oral)   Resp 16   Ht 1.847 m (6' 0.7\")   Wt 105.7 kg (233 lb 1 oz)   BMI 31.00 kg/m      Physical Exam  Vitals reviewed.   Constitutional:       General: He is not in acute distress.     Appearance: Normal appearance.   HENT:      Head: Normocephalic and atraumatic.      Right Ear: External ear normal.      Left Ear: External ear normal.      Nose: Nose normal.      Mouth/Throat:      Pharynx: No oropharyngeal exudate or posterior oropharyngeal erythema.   Eyes:      General: No scleral icterus.  Cardiovascular:      Rate and Rhythm: Normal rate and regular rhythm.      Heart sounds: Normal heart sounds. No murmur heard.    No friction rub. No gallop.   Pulmonary:      Effort: Pulmonary effort is normal. No respiratory distress.      Breath sounds: No wheezing.   Abdominal:      General: Bowel sounds are normal. There is no distension.      Palpations: Abdomen is soft. There is no mass.      Tenderness: There " is no abdominal tenderness.   Musculoskeletal:         General: No swelling. Normal range of motion.      Cervical back: Normal range of motion.   Skin:     Findings: No rash.   Neurological:      General: No focal deficit present.      Mental Status: He is alert and oriented to person, place, and time.      Cranial Nerves: No cranial nerve deficit.      Deep Tendon Reflexes: Reflexes normal.   Psychiatric:         Mood and Affect: Mood normal.         Behavior: Behavior normal.         Thought Content: Thought content normal.         Judgment: Judgment normal.     The 10-year ASCVD risk score (Cereslakesha URRUTIA Jr., et al., 2013) is: 1.9%    Values used to calculate the score:      Age: 47 years      Sex: Male      Is Non- : No      Diabetic: No      Tobacco smoker: No      Systolic Blood Pressure: 136 mmHg      Is BP treated: No      HDL Cholesterol: 49 mg/dL      Total Cholesterol: 152 mg/dL      Diagnostic Test Results:  Labs reviewed in Epic    ASSESSMENT/PLAN:     Problem List Items Addressed This Visit     BMI 31.0-31.9,adult     Muscular build with central adiposity.  Check fasting labs today.  Discussed metabolic syndrome framework.  Continue exercise regimen.  Consider reducing fructose burden.           Relevant Orders    Basic metabolic panel  (Ca, Cl, CO2, Creat, Gluc, K, Na, BUN)    ALT    Hemoglobin A1c (aka HBA1C) (Completed)    Elevated fasting glucose    Relevant Orders    Basic metabolic panel  (Ca, Cl, CO2, Creat, Gluc, K, Na, BUN)    Hemoglobin A1c (aka HBA1C) (Completed)    Insulin level    Generalized anxiety disorder     Consider taper.  Doing well.  He is hesitant to go off the medication given how hard it was to initially acclimate to the medication.  Continue for now.             Routine general medical examination at a health care facility - Primary     Annual exam.  No specific concerns.  Working to add interval training to current exercise regimen.  Central adiposity.  He  "has had at least 1 measurement of elevated fasting glucose levels.  Given weight gain we will evaluate metabolic health with fasting glucose, fasting lipids and fasting insulin (we will plan to calculate Agueda-IR).  Overall doing well.  Up-to-date with preventative health recommendations.           Relevant Medications    loratadine-pseudoePHEDrine (CLARITIN-D 24 HOUR)  MG 24 hr tablet    Thoracic aortic aneurysm without rupture (H)     Exercises vigorously.  Consider CT angiogram in the future to evaluate further.             Other Visit Diagnoses     Seasonal allergies        Relevant Medications    loratadine-pseudoePHEDrine (CLARITIN-D 24 HOUR)  MG 24 hr tablet    Screen for colon cancer        Relevant Orders    COLTelnexusRITO(EXACT SCIENCES) (Completed)        Patient has been advised of split billing requirements and indicates understanding: Yes    COUNSELING:   Reviewed preventive health counseling, as reflected in patient instructions       Regular exercise       Healthy diet/nutrition    Estimated body mass index is 31 kg/m  as calculated from the following:    Height as of this encounter: 1.847 m (6' 0.7\").    Weight as of this encounter: 105.7 kg (233 lb 1 oz).     Weight management plan: Discussed healthy diet and exercise guidelines    He reports that he has never smoked. He has quit using smokeless tobacco.      Counseling Resources:  ATP IV Guidelines  Pooled Cohorts Equation Calculator  FRAX Risk Assessment  ICSI Preventive Guidelines  Dietary Guidelines for Americans, 2010  USDA's MyPlate  ASA Prophylaxis  Lung CA Screening    Sree Martin MD  [facility]  "

## 2022-06-30 NOTE — ASSESSMENT & PLAN NOTE
Consider taper.  Doing well.  He is hesitant to go off the medication given how hard it was to initially acclimate to the medication.  Continue for now.

## 2022-10-01 ENCOUNTER — HEALTH MAINTENANCE LETTER (OUTPATIENT)
Age: 47
End: 2022-10-01

## 2023-05-31 ENCOUNTER — PATIENT OUTREACH (OUTPATIENT)
Dept: CARE COORDINATION | Facility: CLINIC | Age: 48
End: 2023-05-31
Payer: COMMERCIAL

## 2023-06-14 ENCOUNTER — MYC MEDICAL ADVICE (OUTPATIENT)
Dept: FAMILY MEDICINE | Facility: CLINIC | Age: 48
End: 2023-06-14
Payer: COMMERCIAL

## 2023-06-14 DIAGNOSIS — J30.2 SEASONAL ALLERGIES: ICD-10-CM

## 2023-06-14 DIAGNOSIS — F41.1 GENERALIZED ANXIETY DISORDER: ICD-10-CM

## 2023-06-14 DIAGNOSIS — Z00.00 ROUTINE GENERAL MEDICAL EXAMINATION AT A HEALTH CARE FACILITY: ICD-10-CM

## 2023-06-14 RX ORDER — ESCITALOPRAM OXALATE 10 MG/1
10 TABLET ORAL DAILY
Qty: 90 TABLET | Refills: 0 | Status: SHIPPED | OUTPATIENT
Start: 2023-06-14 | End: 2023-09-18

## 2023-06-14 RX ORDER — LORATADINE PSEUDOEPHEDRINE SULFATE 10; 240 MG/1; MG/1
1 TABLET, EXTENDED RELEASE ORAL DAILY
Qty: 90 TABLET | Refills: 3 | Status: SHIPPED | OUTPATIENT
Start: 2023-06-14

## 2023-08-06 ENCOUNTER — HEALTH MAINTENANCE LETTER (OUTPATIENT)
Age: 48
End: 2023-08-06

## 2023-08-17 ENCOUNTER — TELEPHONE (OUTPATIENT)
Dept: FAMILY MEDICINE | Facility: CLINIC | Age: 48
End: 2023-08-17
Payer: COMMERCIAL

## 2023-08-17 NOTE — TELEPHONE ENCOUNTER
Pharmacy states insurance only covers 1 tab per day and are requesting a PA to override the limit for the PRN additional dose in the evening.      Prior Authorization Request  Who s requesting:  Pharmacy  Pharmacy Name and Location: [facility] [id number] [place], [place]   Medication Name: naltrexone (DEPADE/REVIA) 50 MG tablet   Insurance Plan: [employer] OPEN ACCESS   Insurance Member ID Number:  [id number]   CoverMyMeds Key: NA  Informed patient that prior authorizations can take up to 10 business days for response:   NA  Okay to leave a detailed message: No     Route to pool:  P G PA MEDICATION

## 2023-08-17 NOTE — TELEPHONE ENCOUNTER
Prior Authorization Not Needed per Insurance    Medication: Naltrexone HCl 50MG tablets is covered under patients formulary plan.    Per call to insurance, 2 tabs per day of this medication is covered. Pharmacy received a DUR reject that requires the pharmacy to enter in an override code.   Informed pharmacy and they received a paid claim.    Pharmacy Notified:  Yes- pharmacy has fixed issue on their side.  Patient Notified:      Please close encounter when finished.    Thank you,  Central Prior Authorization Team  [phone]

## 2023-09-14 ASSESSMENT — ENCOUNTER SYMPTOMS
MYALGIAS: 0
FEVER: 0
PARESTHESIAS: 0
CHILLS: 0
FREQUENCY: 0
EYE PAIN: 0
ARTHRALGIAS: 0
COUGH: 0
HEMATURIA: 0
DYSURIA: 0
DIARRHEA: 1
NAUSEA: 0
JOINT SWELLING: 0
ABDOMINAL PAIN: 0
SORE THROAT: 0
NERVOUS/ANXIOUS: 1
PALPITATIONS: 0
CONSTIPATION: 0
HEARTBURN: 0
HEMATOCHEZIA: 0
HEADACHES: 0
DIZZINESS: 0
SHORTNESS OF BREATH: 0
WEAKNESS: 0

## 2023-09-18 ENCOUNTER — LAB (OUTPATIENT)
Dept: FAMILY MEDICINE | Facility: CLINIC | Age: 48
End: 2023-09-18

## 2023-09-18 ENCOUNTER — OFFICE VISIT (OUTPATIENT)
Dept: FAMILY MEDICINE | Facility: CLINIC | Age: 48
End: 2023-09-18
Attending: FAMILY MEDICINE
Payer: COMMERCIAL

## 2023-09-18 VITALS
HEIGHT: 72 IN | SYSTOLIC BLOOD PRESSURE: 148 MMHG | DIASTOLIC BLOOD PRESSURE: 94 MMHG | WEIGHT: 231.5 LBS | TEMPERATURE: 98 F | OXYGEN SATURATION: 96 % | RESPIRATION RATE: 16 BRPM | BODY MASS INDEX: 31.36 KG/M2 | HEART RATE: 65 BPM

## 2023-09-18 DIAGNOSIS — Z00.00 ROUTINE GENERAL MEDICAL EXAMINATION AT A HEALTH CARE FACILITY: Primary | ICD-10-CM

## 2023-09-18 DIAGNOSIS — F41.0 PANIC ANXIETY SYNDROME: ICD-10-CM

## 2023-09-18 DIAGNOSIS — Z13.220 SCREENING FOR LIPID DISORDERS: ICD-10-CM

## 2023-09-18 DIAGNOSIS — Z12.11 SCREEN FOR COLON CANCER: ICD-10-CM

## 2023-09-18 DIAGNOSIS — F41.8 PERFORMANCE ANXIETY: ICD-10-CM

## 2023-09-18 DIAGNOSIS — R03.0 ELEVATED BP WITHOUT DIAGNOSIS OF HYPERTENSION: ICD-10-CM

## 2023-09-18 DIAGNOSIS — F10.10 ALCOHOL ABUSE: ICD-10-CM

## 2023-09-18 DIAGNOSIS — F41.1 GENERALIZED ANXIETY DISORDER: ICD-10-CM

## 2023-09-18 DIAGNOSIS — R73.01 ELEVATED FASTING GLUCOSE: ICD-10-CM

## 2023-09-18 PROCEDURE — 84460 ALANINE AMINO (ALT) (SGPT): CPT | Performed by: FAMILY MEDICINE

## 2023-09-18 PROCEDURE — 84450 TRANSFERASE (AST) (SGOT): CPT | Performed by: FAMILY MEDICINE

## 2023-09-18 PROCEDURE — 99396 PREV VISIT EST AGE 40-64: CPT | Performed by: FAMILY MEDICINE

## 2023-09-18 PROCEDURE — 99214 OFFICE O/P EST MOD 30 MIN: CPT | Mod: 25 | Performed by: FAMILY MEDICINE

## 2023-09-18 PROCEDURE — 80061 LIPID PANEL: CPT | Performed by: FAMILY MEDICINE

## 2023-09-18 PROCEDURE — 80048 BASIC METABOLIC PNL TOTAL CA: CPT | Performed by: FAMILY MEDICINE

## 2023-09-18 PROCEDURE — 36415 COLL VENOUS BLD VENIPUNCTURE: CPT | Performed by: FAMILY MEDICINE

## 2023-09-18 RX ORDER — NALTREXONE HYDROCHLORIDE 50 MG/1
50 TABLET, FILM COATED ORAL DAILY
Qty: 90 TABLET | Refills: 3 | Status: SHIPPED | OUTPATIENT
Start: 2023-09-18 | End: 2023-09-20

## 2023-09-18 RX ORDER — ESCITALOPRAM OXALATE 10 MG/1
10 TABLET ORAL DAILY
Qty: 90 TABLET | Refills: 3 | Status: SHIPPED | OUTPATIENT
Start: 2023-09-18 | End: 2024-09-16

## 2023-09-18 RX ORDER — PROPRANOLOL HYDROCHLORIDE 40 MG/1
40 TABLET ORAL DAILY PRN
Qty: 30 TABLET | Refills: 5 | Status: SHIPPED | OUTPATIENT
Start: 2023-09-18

## 2023-09-18 ASSESSMENT — ENCOUNTER SYMPTOMS
CONSTIPATION: 0
HEARTBURN: 0
EYE PAIN: 0
FREQUENCY: 0
WEAKNESS: 0
JOINT SWELLING: 0
FEVER: 0
CHILLS: 0
PALPITATIONS: 0
ABDOMINAL PAIN: 0
SHORTNESS OF BREATH: 0
NERVOUS/ANXIOUS: 1
DYSURIA: 0
HEMATOCHEZIA: 0
PARESTHESIAS: 0
SORE THROAT: 0
DIZZINESS: 0
DIARRHEA: 1
HEMATURIA: 0
MYALGIAS: 0
NAUSEA: 0
HEADACHES: 0
COUGH: 0
ARTHRALGIAS: 0

## 2023-09-18 NOTE — PROGRESS NOTES
"SUBJECTIVE:   CC: Cordell is an 48 year old who presents for preventative health visit.       9/18/2023    11:06 AM   Additional Questions   Roomed by xl       Mood / Alcohol:   - over the summer he realized that he was drinking too much over the weekends.  \"It became routine.\"  \"Way to much.\" This was destructive to family and marriage.  Naltrexone helpful over the past month. \"Not drunk in a month.\" Family history of multigenerational alcoholism.  Trying to live better.  He has not targeted abstinence. Now only drinking socially.      - lexapro helps   - wife concerned.     Healthy Habits:     Getting at least 3 servings of Calcium per day:  Yes    Bi-annual eye exam:  Yes    Dental care twice a year:  Yes    Sleep apnea or symptoms of sleep apnea:  None and Excessive snoring    Diet:  Regular (no restrictions)    Frequency of exercise:  4-5 days/week    Duration of exercise:  45-60 minutes    Taking medications regularly:  Yes    Medication side effects:  Other    Additional concerns today:  No    Today's PHQ-2 Score:       9/18/2023    10:43 AM   PHQ-2 ( 1999 Pfizer)   Q1: Little interest or pleasure in doing things 0   Q2: Feeling down, depressed or hopeless 0   PHQ-2 Score 0   Q1: Little interest or pleasure in doing things Not at all   Q2: Feeling down, depressed or hopeless Not at all   PHQ-2 Score 0     Social History     Tobacco Use    Smoking status: Never    Smokeless tobacco: Former     Quit date: 4/1/2001   Substance Use Topics    Alcohol use: Yes     Alcohol/week: 6.0 standard drinks of alcohol             9/14/2023    11:26 AM   Alcohol Use   Prescreen: >3 drinks/day or >7 drinks/week? Yes   AUDIT SCORE  22         9/14/2023    11:26 AM   AUDIT - Alcohol Use Disorders Identification Test - Reproduced from the World Health Organization Audit 2001 (Second Edition)   1.  How often do you have a drink containing alcohol? 2 to 3 times a week   2.  How many drinks containing alcohol do you have on a typical " day when you are drinking? 7 to 9   3.  How often do you have five or more drinks on one occasion? Weekly   4.  How often during the last year have you found that you were not able to stop drinking once you had started? Weekly   5.  How often during the last year have you failed to do what was normally expected of you because of drinking? Monthly   6.  How often during the last year have you needed a first drink in the morning to get yourself going after a heavy drinking session? Never   7.  How often during the last year have you had a feeling of guilt or remorse after drinking? Monthly   8.  How often during the last year have you been unable to remember what happened the night before because of your drinking? Monthly   9.  Have you or someone else been injured because of your drinking? No   10. Has a relative, friend, doctor or other health care worker been concerned about your drinking or suggested you cut down? Yes, during the last year   TOTAL SCORE 22       Last PSA: No results found for: PSA    Reviewed orders with patient. Reviewed health maintenance and updated orders accordingly - Yes    Reviewed and updated as needed this visit by clinical staff   Tobacco  Allergies  Meds              Reviewed and updated as needed this visit by Provider                     Review of Systems   Constitutional:  Negative for chills and fever.   HENT:  Negative for congestion, ear pain, hearing loss and sore throat.    Eyes:  Negative for pain and visual disturbance.   Respiratory:  Negative for cough and shortness of breath.    Cardiovascular:  Negative for chest pain, palpitations and peripheral edema.   Gastrointestinal:  Positive for diarrhea. Negative for abdominal pain, constipation, heartburn, hematochezia and nausea.   Genitourinary:  Negative for dysuria, frequency, genital sores, hematuria, impotence, penile discharge and urgency.   Musculoskeletal:  Negative for arthralgias, joint swelling and myalgias.  "  Skin:  Negative for rash.   Neurological:  Negative for dizziness, weakness, headaches and paresthesias.   Psychiatric/Behavioral:  Negative for mood changes. The patient is nervous/anxious.      OBJECTIVE:   BP (!) 148/94 (BP Location: Left arm, Patient Position: Sitting, Cuff Size: Adult Large)   Pulse 65   Temp 98  F (36.7  C) (Oral)   Resp 16   Ht 1.835 m (6' 0.25\")   Wt 105 kg (231 lb 8 oz)   SpO2 96%   BMI 31.18 kg/m      Physical Exam  Vitals reviewed.   Constitutional:       General: He is not in acute distress.     Appearance: Normal appearance. He is not ill-appearing.   HENT:      Head: Normocephalic and atraumatic.      Right Ear: External ear normal.      Left Ear: External ear normal.      Nose: Nose normal.      Mouth/Throat:      Pharynx: Oropharynx is clear. No oropharyngeal exudate or posterior oropharyngeal erythema.   Eyes:      General: No scleral icterus.        Right eye: No discharge.         Left eye: No discharge.      Extraocular Movements: Extraocular movements intact.      Conjunctiva/sclera: Conjunctivae normal.      Pupils: Pupils are equal, round, and reactive to light.   Neck:      Comments: No thyromegaly.  Cardiovascular:      Rate and Rhythm: Normal rate and regular rhythm.      Heart sounds: Normal heart sounds. No murmur heard.     No friction rub. No gallop.   Pulmonary:      Effort: Pulmonary effort is normal. No respiratory distress.      Breath sounds: Normal breath sounds. No wheezing or rales.   Abdominal:      General: There is no distension.      Palpations: Abdomen is soft. There is no mass.      Tenderness: There is no abdominal tenderness.   Musculoskeletal:         General: No signs of injury. Normal range of motion.      Cervical back: Normal range of motion.      Right lower leg: No edema.      Left lower leg: No edema.   Lymphadenopathy:      Cervical: No cervical adenopathy.   Skin:     General: Skin is warm.      Coloration: Skin is not jaundiced.      " Findings: No rash.   Neurological:      General: No focal deficit present.      Mental Status: He is alert and oriented to person, place, and time.      Cranial Nerves: No cranial nerve deficit.      Deep Tendon Reflexes: Reflexes normal.   Psychiatric:         Mood and Affect: Mood normal.         ASSESSMENT/PLAN:     Problem List Items Addressed This Visit       Alcohol abuse     Alcohol consumption has decreased dramatically after he realized that he had been drinking most nights.  He finds naltrexone helpful and blunting cravings.  He is still drinking and does not have a goal of complete abstinence.  So far, social drinking seems to be going well.  Continue naltrexone for now.  GI upset?  He will try 25 mg and see if he still gets some suppression.  TBD.         Relevant Medications    naltrexone (DEPADE/REVIA) 50 MG tablet    Other Relevant Orders    Basic metabolic panel  (Ca, Cl, CO2, Creat, Gluc, K, Na, BUN)    ALT    AST    BMI 31.0-31.9,adult    Elevated BP without diagnosis of hypertension     Labs today.  Patient will obtain and use home blood pressure cuff.  If remains elevated he will report back.         Elevated fasting glucose    Relevant Orders    Basic metabolic panel  (Ca, Cl, CO2, Creat, Gluc, K, Na, BUN)    Generalized anxiety disorder     The patient states that he is actually doing quite bit better recently.  No side effects from escitalopram.  Continue current therapy.         Relevant Medications    escitalopram (LEXAPRO) 10 MG tablet    Panic anxiety syndrome    Relevant Medications    escitalopram (LEXAPRO) 10 MG tablet    Performance anxiety     Resume propranolol.         Relevant Medications    propranolol (INDERAL) 40 MG tablet    escitalopram (LEXAPRO) 10 MG tablet    Routine general medical examination at a health care facility - Primary     Other Visit Diagnoses       Screen for colon cancer        Relevant Orders    LORENZA(EXACT SCIENCES)    Screening for lipid disorders      "   Relevant Orders    Lipid panel reflex to direct LDL Fasting          Patient has been advised of split billing requirements and indicates understanding: Yes    COUNSELING:   Reviewed preventive health counseling, as reflected in patient instructions       Regular exercise       Healthy diet/nutrition    BMI:   Estimated body mass index is 31.18 kg/m  as calculated from the following:    Height as of this encounter: 1.835 m (6' 0.25\").    Weight as of this encounter: 105 kg (231 lb 8 oz).   Weight management plan: Discussed healthy diet and exercise guidelines      He reports that he has never smoked. He quit smokeless tobacco use about 22 years ago.            Sree Martin MD  [facility]  "

## 2023-09-19 LAB
ALT SERPL W P-5'-P-CCNC: 23 U/L (ref 0–70)
ANION GAP SERPL CALCULATED.3IONS-SCNC: 12 MMOL/L (ref 7–15)
AST SERPL W P-5'-P-CCNC: 30 U/L (ref 0–45)
BUN SERPL-MCNC: 18.7 MG/DL (ref 6–20)
CALCIUM SERPL-MCNC: 9.9 MG/DL (ref 8.6–10)
CHLORIDE SERPL-SCNC: 100 MMOL/L (ref 98–107)
CHOLEST SERPL-MCNC: 176 MG/DL
CREAT SERPL-MCNC: 1.02 MG/DL (ref 0.67–1.17)
DEPRECATED HCO3 PLAS-SCNC: 28 MMOL/L (ref 22–29)
EGFRCR SERPLBLD CKD-EPI 2021: >90 ML/MIN/1.73M2
GLUCOSE SERPL-MCNC: 92 MG/DL (ref 70–99)
HDLC SERPL-MCNC: 52 MG/DL
LDLC SERPL CALC-MCNC: 94 MG/DL
NONHDLC SERPL-MCNC: 124 MG/DL
POTASSIUM SERPL-SCNC: 4.1 MMOL/L (ref 3.4–5.3)
SODIUM SERPL-SCNC: 140 MMOL/L (ref 136–145)
TRIGL SERPL-MCNC: 148 MG/DL

## 2023-09-19 NOTE — ASSESSMENT & PLAN NOTE
The patient states that he is actually doing quite bit better recently.  No side effects from escitalopram.  Continue current therapy.

## 2023-09-19 NOTE — ASSESSMENT & PLAN NOTE
Alcohol consumption has decreased dramatically after he realized that he had been drinking most nights.  He finds naltrexone helpful and blunting cravings.  He is still drinking and does not have a goal of complete abstinence.  So far, social drinking seems to be going well.  Continue naltrexone for now.  GI upset?  He will try 25 mg and see if he still gets some suppression.  TBD.

## 2023-09-19 NOTE — ASSESSMENT & PLAN NOTE
Labs today.  Patient will obtain and use home blood pressure cuff.  If remains elevated he will report back.

## 2023-09-20 ENCOUNTER — TELEPHONE (OUTPATIENT)
Dept: FAMILY MEDICINE | Facility: CLINIC | Age: 48
End: 2023-09-20
Payer: COMMERCIAL

## 2023-09-20 DIAGNOSIS — F10.10 ALCOHOL ABUSE: ICD-10-CM

## 2023-09-20 RX ORDER — NALTREXONE HYDROCHLORIDE 50 MG/1
50 TABLET, FILM COATED ORAL DAILY
Qty: 90 TABLET | Refills: 3 | Status: SHIPPED | OUTPATIENT
Start: 2023-09-20

## 2023-09-20 NOTE — TELEPHONE ENCOUNTER
General Call    Contacts         Type Contact Phone/Fax    09/20/2023 09:05 AM CDT Phone (Incoming) [id number] - [place], [place] - [address] (Pharmacy) [phone]          Reason for Call:  Prescription Clarification/FYI    What are your questions or concerns:  Pharmacy calling to report that for patient's naltrexone prescription, insurance has a max of 1 tablet daily.    naltrexone (DEPADE/REVIA) 50 MG tablet   Sig - Route: Take 1 tablet (50 mg) by mouth daily You may take an additional dose in the evening if you are struggling with cravings. - Oral

## 2024-01-21 DIAGNOSIS — F41.8 PERFORMANCE ANXIETY: ICD-10-CM

## 2024-01-22 RX ORDER — PROPRANOLOL HYDROCHLORIDE 40 MG/1
40 TABLET ORAL DAILY PRN
Qty: 90 TABLET | Refills: 1 | OUTPATIENT
Start: 2024-01-22

## 2024-08-19 ENCOUNTER — PATIENT OUTREACH (OUTPATIENT)
Dept: CARE COORDINATION | Facility: CLINIC | Age: 49
End: 2024-08-19
Payer: COMMERCIAL

## 2024-09-02 ENCOUNTER — PATIENT OUTREACH (OUTPATIENT)
Dept: CARE COORDINATION | Facility: CLINIC | Age: 49
End: 2024-09-02
Payer: COMMERCIAL

## 2024-09-16 ENCOUNTER — MYC MEDICAL ADVICE (OUTPATIENT)
Dept: FAMILY MEDICINE | Facility: CLINIC | Age: 49
End: 2024-09-16
Payer: COMMERCIAL

## 2024-09-16 DIAGNOSIS — F41.1 GENERALIZED ANXIETY DISORDER: ICD-10-CM

## 2024-09-16 RX ORDER — ESCITALOPRAM OXALATE 10 MG/1
10 TABLET ORAL DAILY
Qty: 90 TABLET | Refills: 0 | Status: SHIPPED | OUTPATIENT
Start: 2024-09-16

## 2024-11-02 ASSESSMENT — ANXIETY QUESTIONNAIRES
5. BEING SO RESTLESS THAT IT IS HARD TO SIT STILL: NOT AT ALL
7. FEELING AFRAID AS IF SOMETHING AWFUL MIGHT HAPPEN: NOT AT ALL
GAD7 TOTAL SCORE: 0
1. FEELING NERVOUS, ANXIOUS, OR ON EDGE: NOT AT ALL
IF YOU CHECKED OFF ANY PROBLEMS ON THIS QUESTIONNAIRE, HOW DIFFICULT HAVE THESE PROBLEMS MADE IT FOR YOU TO DO YOUR WORK, TAKE CARE OF THINGS AT HOME, OR GET ALONG WITH OTHER PEOPLE: NOT DIFFICULT AT ALL
8. IF YOU CHECKED OFF ANY PROBLEMS, HOW DIFFICULT HAVE THESE MADE IT FOR YOU TO DO YOUR WORK, TAKE CARE OF THINGS AT HOME, OR GET ALONG WITH OTHER PEOPLE?: NOT DIFFICULT AT ALL
6. BECOMING EASILY ANNOYED OR IRRITABLE: NOT AT ALL
GAD7 TOTAL SCORE: 0
3. WORRYING TOO MUCH ABOUT DIFFERENT THINGS: NOT AT ALL
2. NOT BEING ABLE TO STOP OR CONTROL WORRYING: NOT AT ALL
GAD7 TOTAL SCORE: 0
4. TROUBLE RELAXING: NOT AT ALL
7. FEELING AFRAID AS IF SOMETHING AWFUL MIGHT HAPPEN: NOT AT ALL

## 2024-11-05 ENCOUNTER — ORDERS ONLY (AUTO-RELEASED) (OUTPATIENT)
Dept: FAMILY MEDICINE | Facility: CLINIC | Age: 49
End: 2024-11-05

## 2024-11-05 ENCOUNTER — OFFICE VISIT (OUTPATIENT)
Dept: FAMILY MEDICINE | Facility: CLINIC | Age: 49
End: 2024-11-05
Attending: FAMILY MEDICINE
Payer: COMMERCIAL

## 2024-11-05 VITALS
OXYGEN SATURATION: 94 % | WEIGHT: 245.3 LBS | BODY MASS INDEX: 33.23 KG/M2 | HEIGHT: 72 IN | SYSTOLIC BLOOD PRESSURE: 138 MMHG | DIASTOLIC BLOOD PRESSURE: 84 MMHG | RESPIRATION RATE: 16 BRPM | TEMPERATURE: 98.6 F | HEART RATE: 61 BPM

## 2024-11-05 DIAGNOSIS — Z12.11 SCREEN FOR COLON CANCER: ICD-10-CM

## 2024-11-05 DIAGNOSIS — Z13.220 SCREENING FOR LIPID DISORDERS: ICD-10-CM

## 2024-11-05 DIAGNOSIS — F41.1 GENERALIZED ANXIETY DISORDER: ICD-10-CM

## 2024-11-05 DIAGNOSIS — I71.21 ANEURYSM OF ASCENDING AORTA WITHOUT RUPTURE (H): ICD-10-CM

## 2024-11-05 DIAGNOSIS — E66.811 CLASS 1 OBESITY DUE TO EXCESS CALORIES WITH SERIOUS COMORBIDITY AND BODY MASS INDEX (BMI) OF 33.0 TO 33.9 IN ADULT: ICD-10-CM

## 2024-11-05 DIAGNOSIS — F10.11 HISTORY OF ALCOHOL ABUSE: ICD-10-CM

## 2024-11-05 DIAGNOSIS — R03.0 ELEVATED BP WITHOUT DIAGNOSIS OF HYPERTENSION: ICD-10-CM

## 2024-11-05 DIAGNOSIS — F41.0 PANIC ANXIETY SYNDROME: ICD-10-CM

## 2024-11-05 DIAGNOSIS — E66.09 CLASS 1 OBESITY DUE TO EXCESS CALORIES WITH SERIOUS COMORBIDITY AND BODY MASS INDEX (BMI) OF 33.0 TO 33.9 IN ADULT: ICD-10-CM

## 2024-11-05 DIAGNOSIS — Z00.00 ROUTINE GENERAL MEDICAL EXAMINATION AT A HEALTH CARE FACILITY: Primary | ICD-10-CM

## 2024-11-05 LAB
ALT SERPL W P-5'-P-CCNC: 33 U/L (ref 0–70)
ANION GAP SERPL CALCULATED.3IONS-SCNC: 15 MMOL/L (ref 7–15)
BUN SERPL-MCNC: 17.1 MG/DL (ref 6–20)
CALCIUM SERPL-MCNC: 9.7 MG/DL (ref 8.8–10.4)
CHLORIDE SERPL-SCNC: 104 MMOL/L (ref 98–107)
CHOLEST SERPL-MCNC: 199 MG/DL
CREAT SERPL-MCNC: 0.96 MG/DL (ref 0.67–1.17)
CYSTATIN C (ROCHE): 0.7 MG/L (ref 0.6–1)
EGFRCR SERPLBLD CKD-EPI 2021: >90 ML/MIN/1.73M2
FASTING STATUS PATIENT QL REPORTED: YES
FASTING STATUS PATIENT QL REPORTED: YES
GFR/BSA.PRED SERPLBLD CYS-BASED-ARV: >90 ML/MIN/1.73M2
GLUCOSE SERPL-MCNC: 89 MG/DL (ref 70–99)
HCO3 SERPL-SCNC: 23 MMOL/L (ref 22–29)
HDLC SERPL-MCNC: 48 MG/DL
LDLC SERPL CALC-MCNC: 123 MG/DL
NONHDLC SERPL-MCNC: 151 MG/DL
POTASSIUM SERPL-SCNC: 4.5 MMOL/L (ref 3.4–5.3)
SODIUM SERPL-SCNC: 142 MMOL/L (ref 135–145)
TRIGL SERPL-MCNC: 141 MG/DL

## 2024-11-05 PROCEDURE — 80048 BASIC METABOLIC PNL TOTAL CA: CPT | Performed by: FAMILY MEDICINE

## 2024-11-05 PROCEDURE — 36415 COLL VENOUS BLD VENIPUNCTURE: CPT | Performed by: FAMILY MEDICINE

## 2024-11-05 PROCEDURE — 84460 ALANINE AMINO (ALT) (SGPT): CPT | Performed by: FAMILY MEDICINE

## 2024-11-05 PROCEDURE — 99396 PREV VISIT EST AGE 40-64: CPT | Performed by: FAMILY MEDICINE

## 2024-11-05 PROCEDURE — 99213 OFFICE O/P EST LOW 20 MIN: CPT | Mod: 25 | Performed by: FAMILY MEDICINE

## 2024-11-05 PROCEDURE — 80061 LIPID PANEL: CPT | Performed by: FAMILY MEDICINE

## 2024-11-05 PROCEDURE — 82610 CYSTATIN C: CPT | Performed by: FAMILY MEDICINE

## 2024-11-05 RX ORDER — ESCITALOPRAM OXALATE 10 MG/1
10 TABLET ORAL DAILY
Qty: 90 TABLET | Refills: 3 | Status: SHIPPED | OUTPATIENT
Start: 2024-11-05

## 2024-11-05 SDOH — HEALTH STABILITY: PHYSICAL HEALTH: ON AVERAGE, HOW MANY MINUTES DO YOU ENGAGE IN EXERCISE AT THIS LEVEL?: 70 MIN

## 2024-11-05 SDOH — HEALTH STABILITY: PHYSICAL HEALTH: ON AVERAGE, HOW MANY DAYS PER WEEK DO YOU ENGAGE IN MODERATE TO STRENUOUS EXERCISE (LIKE A BRISK WALK)?: 5 DAYS

## 2024-11-05 ASSESSMENT — ENCOUNTER SYMPTOMS
COUGH: 0
ARTHRALGIAS: 0
DYSURIA: 0
SORE THROAT: 0
COLOR CHANGE: 0
SEIZURES: 0
ABDOMINAL PAIN: 0
CHILLS: 0
HEMATURIA: 0
EYE PAIN: 0
VOMITING: 0
PALPITATIONS: 0
BACK PAIN: 0
FEVER: 0
SHORTNESS OF BREATH: 0

## 2024-11-05 ASSESSMENT — SOCIAL DETERMINANTS OF HEALTH (SDOH): HOW OFTEN DO YOU GET TOGETHER WITH FRIENDS OR RELATIVES?: ONCE A WEEK

## 2024-11-05 NOTE — ASSESSMENT & PLAN NOTE
No longer requiring naltrexone.  He has greatly reduced alcohol consumption and feels like it is not an issue driving his health at this time.  He is drinking at most 1 or 2 nights per week.  Continue to follow.

## 2024-11-05 NOTE — PATIENT INSTRUCTIONS
Patient Education   Preventive Care Advice   This is general advice given by our system to help you stay healthy. However, your care team may have specific advice just for you. Please talk to your care team about your preventive care needs.  Nutrition  Eat 5 or more servings of fruits and vegetables each day.  Try wheat bread, brown rice and whole grain pasta (instead of white bread, rice, and pasta).  Get enough calcium and vitamin D. Check the label on foods and aim for 100% of the RDA (recommended daily allowance).  Lifestyle  Exercise at least 150 minutes each week  (30 minutes a day, 5 days a week).  Do muscle strengthening activities 2 days a week. These help control your weight and prevent disease.  No smoking.  Wear sunscreen to prevent skin cancer.  Have a dental exam and cleaning every 6 months.  Yearly exams  See your health care team every year to talk about:  Any changes in your health.  Any medicines your care team has prescribed.  Preventive care, family planning, and ways to prevent chronic diseases.  Shots (vaccines)   HPV shots (up to age 26), if you've never had them before.  Hepatitis B shots (up to age 59), if you've never had them before.  COVID-19 shot: Get this shot when it's due.  Flu shot: Get a flu shot every year.  Tetanus shot: Get a tetanus shot every 10 years.  Pneumococcal, hepatitis A, and RSV shots: Ask your care team if you need these based on your risk.  Shingles shot (for age 50 and up)  General health tests  Diabetes screening:  Starting at age 35, Get screened for diabetes at least every 3 years.  If you are younger than age 35, ask your care team if you should be screened for diabetes.  Cholesterol test: At age 39, start having a cholesterol test every 5 years, or more often if advised.  Bone density scan (DEXA): At age 50, ask your care team if you should have this scan for osteoporosis (brittle bones).  Hepatitis C: Get tested at least once in your life.  STIs (sexually  transmitted infections)  Before age 24: Ask your care team if you should be screened for STIs.  After age 24: Get screened for STIs if you're at risk. You are at risk for STIs (including HIV) if:  You are sexually active with more than one person.  You don't use condoms every time.  You or a partner was diagnosed with a sexually transmitted infection.  If you are at risk for HIV, ask about PrEP medicine to prevent HIV.  Get tested for HIV at least once in your life, whether you are at risk for HIV or not.  Cancer screening tests  Cervical cancer screening: If you have a cervix, begin getting regular cervical cancer screening tests starting at age 21.  Breast cancer scan (mammogram): If you've ever had breasts, begin having regular mammograms starting at age 40. This is a scan to check for breast cancer.  Colon cancer screening: It is important to start screening for colon cancer at age 45.  Have a colonoscopy test every 10 years (or more often if you're at risk) Or, ask your provider about stool tests like a FIT test every year or Cologuard test every 3 years.  To learn more about your testing options, visit:   .  For help making a decision, visit:   https://bit.ly/mz93530.  Prostate cancer screening test: If you have a prostate, ask your care team if a prostate cancer screening test (PSA) at age 55 is right for you.  Lung cancer screening: If you are a current or former smoker ages 50 to 80, ask your care team if ongoing lung cancer screenings are right for you.  For informational purposes only. Not to replace the advice of your health care provider. Copyright   2023 [employer]. All rights reserved. Clinically reviewed by the [facility] Transitions Program. Gemmyo [id number] - REV 01/24.

## 2024-11-05 NOTE — ASSESSMENT & PLAN NOTE
Annual exam.  He has focused on physical activity since I last saw him.  He is exercising multiple days per week including strength training.  He generally feels stronger and he believes that some of the weight gain that has happened in the past 12 months is the result of improved overall muscular strength.  He has noticed that some of the weight is accumulating around his waist.  We discussed this in the context of metabolic syndrome.  I think it is unlikely that he gained weight as a result of escitalopram as he is been on this medication for quite some time.  We reviewed nutrition.  Interestingly, theoretically he does meet criteria for a GLP-1 receptor agonist.  His employer does cover these medications and I wonder if you have an easier time meeting his metabolic health goals on 5 mg of tirzepatide.  For now, no change.  He declines discussion around vaccines.  [facility]  Follow-up 6-12 months recommended.

## 2024-11-05 NOTE — PROGRESS NOTES
Preventive Care Visit  [facility]  Sree Martin MD, Family Medicine  Nov 5, 2024      Assessment & Plan   Problem List Items Addressed This Visit       Class 1 obesity due to excess calories with serious comorbidity and body mass index (BMI) of 33.0 to 33.9 in adult     Discussed in the context of metabolic health.  Fasting lab work completed today.         Elevated BP without diagnosis of hypertension    Relevant Orders    Basic metabolic panel  (Ca, Cl, CO2, Creat, Gluc, K, Na, BUN)    Cystatin C with GFR    Generalized anxiety disorder    Relevant Medications    escitalopram (LEXAPRO) 10 MG tablet    History of alcohol abuse     No longer requiring naltrexone.  He has greatly reduced alcohol consumption and feels like it is not an issue driving his health at this time.  He is drinking at most 1 or 2 nights per week.  Continue to follow.         Panic anxiety syndrome    Relevant Medications    escitalopram (LEXAPRO) 10 MG tablet    Routine general medical examination at a health care facility - Primary     Annual exam.  He has focused on physical activity since I last saw him.  He is exercising multiple days per week including strength training.  He generally feels stronger and he believes that some of the weight gain that has happened in the past 12 months is the result of improved overall muscular strength.  He has noticed that some of the weight is accumulating around his waist.  We discussed this in the context of metabolic syndrome.  I think it is unlikely that he gained weight as a result of escitalopram as he is been on this medication for quite some time.  We reviewed nutrition.  Interestingly, theoretically he does meet criteria for a GLP-1 receptor agonist.  His employer does cover these medications and I wonder if you have an easier time meeting his metabolic health goals on 5 mg of tirzepatide.  For now, no change.  He declines discussion around vaccines.  He will proceed  "with colon cancer screening through [facility].  Follow-up 6-12 months recommended.         Thoracic aortic aneurysm without rupture (H)     Continue to monitor.          Other Visit Diagnoses       Screen for colon cancer        Relevant Orders    [employer]([employer])    Screening for lipid disorders        Relevant Orders    ALT    Lipid panel reflex to direct LDL Fasting             Patient has been advised of split billing requirements and indicates understanding: Yes       BMI  Estimated body mass index is 33.04 kg/m  as calculated from the following:    Height as of this encounter: 1.835 m (6' 0.25\").    Weight as of this encounter: 111.3 kg (245 lb 4.8 oz).   Weight management plan: Discussed healthy diet and exercise guidelines    Counseling  Appropriate preventive services were addressed with this patient via screening, questionnaire, or discussion as appropriate for fall prevention, nutrition, physical activity, Tobacco-use cessation, social engagement, weight loss and cognition.  Checklist reviewing preventive services available has been given to the patient.  Reviewed patient's diet, addressing concerns and/or questions.   He is at risk for psychosocial distress and has been provided with information to reduce risk.     Subjective   Cordell is a 49 year old, presenting for the following:  Recheck Medication and Refill Request        11/5/2024     8:26 AM   Additional Questions   Roomed by xl          HPI      Generally doing well.     BP:   - taking more recently. Sciatica.      [employer] health: lifts weight and cardio 5-6 days per week. Minimal alcohol.  Diet: more sugar. Tries to get abundant protein.     Health Care Directive  Patient does not have a Health Care Directive: Discussed advance care planning with patient; information given to patient to review.      11/5/2024   General Health   How would you rate your overall physical health? Good   Feel stress (tense, anxious, or unable to sleep) Only " a little      (!) STRESS CONCERN      11/5/2024   Nutrition   Three or more servings of calcium each day? Yes   Diet: Regular (no restrictions)   How many servings of fruit and vegetables per day? (!) 2-3   How many sweetened beverages each day? 0-1            11/5/2024   Exercise   Days per week of moderate/strenous exercise 5 days   Average minutes spent exercising at this level 70 min            11/5/2024   Social Factors   Frequency of gathering with friends or relatives Once a week   Worry food won't last until get money to buy more No   Food not last or not have enough money for food? No   Do you have housing? (Housing is defined as stable permanent housing and does not include staying ouside in a car, in a tent, in an abandoned building, in an overnight shelter, or couch-surfing.) Yes   Are you worried about losing your housing? No   Lack of transportation? No   Unable to get utilities (heat,electricity)? No            11/5/2024   Dental   Dentist two times every year? Yes            11/5/2024   TB Screening   Were you born outside of the [place]? No            Today's PHQ-2 Score:       11/5/2024     7:53 AM   PHQ-2 ( 1999 [employer])   Q1: Little interest or pleasure in doing things 0    Q2: Feeling down, depressed or hopeless 0    PHQ-2 Score 0    Q1: Little interest or pleasure in doing things Not at all   Q2: Feeling down, depressed or hopeless Not at all   PHQ-2 Score 0       Patient-reported           11/5/2024   Substance Use   Alcohol more than 3/day or more than 7/wk No   Do you use any other substances recreationally? No        Social History     Tobacco Use    Smoking status: Never    Smokeless tobacco: Former     Quit date: 4/1/2001   Vaping Use    Vaping status: Never Used   Substance Use Topics    Alcohol use: Yes     Alcohol/week: 6.0 standard drinks of alcohol    Drug use: No           11/5/2024   STI Screening   New sexual partner(s) since last STI/HIV test? No      ASCVD Risk   The 10-year  ASCVD risk score (Vincent DESOUZA, et al., 2019) is: 2.8%    Values used to calculate the score:      Age: 49 years      Sex: Male      Is Non- : No      Diabetic: No      Tobacco smoker: No      Systolic Blood Pressure: 138 mmHg      Is BP treated: No      HDL Cholesterol: 52 mg/dL      Total Cholesterol: 176 mg/dL        11/5/2024   Contraception/Family Planning   Questions about contraception or family planning No           Reviewed and updated as needed this visit by Provider   Tobacco  Allergies  Meds  Problems  Med Hx  Surg Hx  Fam Hx            Patient Active Problem List   Diagnosis    Generalized anxiety disorder    Panic anxiety syndrome    Routine general medical examination at a health care facility    Class 1 obesity due to excess calories with serious comorbidity and body mass index (BMI) of 33.0 to 33.9 in adult    Elevated fasting glucose    Thoracic aortic aneurysm without rupture (H)    Elevated BP without diagnosis of hypertension    Performance anxiety    History of alcohol abuse     Past Surgical History:   Procedure Laterality Date    FRACTURE SURGERY      right femur x 2       Social History     Tobacco Use    Smoking status: Never    Smokeless tobacco: Former     Quit date: 4/1/2001   Substance Use Topics    Alcohol use: Yes     Alcohol/week: 6.0 standard drinks of alcohol     Family History   Problem Relation Age of Onset    No Known Problems Mother     Hypertension Father     Anxiety Disorder Father     No Known Problems Sister     No Known Problems Brother     Brain Cancer Paternal Grandmother     Cancer Paternal Grandfather     Diabetes Paternal Grandfather     Heart Disease No family hx of     Breast Cancer No family hx of     Prostate Cancer No family hx of     Bipolar Disorder No family hx of     Colon Cancer No family hx of          Review of Systems   Constitutional:  Negative for chills and fever.   HENT:  Negative for ear pain and sore throat.    Eyes:   "Negative for pain and visual disturbance.   Respiratory:  Negative for cough and shortness of breath.    Cardiovascular:  Negative for chest pain and palpitations.   Gastrointestinal:  Negative for abdominal pain and vomiting.   Genitourinary:  Negative for dysuria and hematuria.   Musculoskeletal:  Negative for arthralgias and back pain.   Skin:  Negative for color change and rash.   Neurological:  Negative for seizures and syncope.   All other systems reviewed and are negative.         Objective    Exam  /84   Pulse 61   Temp 98.6  F (37  C) (Oral)   Resp 16   Ht 1.835 m (6' 0.25\")   Wt 111.3 kg (245 lb 4.8 oz)   SpO2 94%   BMI 33.04 kg/m     Estimated body mass index is 33.04 kg/m  as calculated from the following:    Height as of this encounter: 1.835 m (6' 0.25\").    Weight as of this encounter: 111.3 kg (245 lb 4.8 oz).    Physical Exam  Vitals reviewed.   Constitutional:       General: He is not in acute distress.     Appearance: Normal appearance. He is not ill-appearing.   HENT:      Head: Normocephalic and atraumatic.      Right Ear: External ear normal.      Left Ear: External ear normal.      Nose: Nose normal.      Mouth/Throat:      Pharynx: Oropharynx is clear. No oropharyngeal exudate or posterior oropharyngeal erythema.   Eyes:      General: No scleral icterus.        Right eye: No discharge.         Left eye: No discharge.      Extraocular Movements: Extraocular movements intact.      Conjunctiva/sclera: Conjunctivae normal.      Pupils: Pupils are equal, round, and reactive to light.   Neck:      Comments: No thyromegaly.  Cardiovascular:      Rate and Rhythm: Normal rate and regular rhythm.      Heart sounds: Normal heart sounds. No murmur heard.     No friction rub. No gallop.   Pulmonary:      Effort: Pulmonary effort is normal. No respiratory distress.      Breath sounds: Normal breath sounds. No wheezing or rales.   Abdominal:      General: There is no distension.      " Palpations: Abdomen is soft. There is no mass.      Tenderness: There is no abdominal tenderness.   Musculoskeletal:         General: No signs of injury. Normal range of motion.      Cervical back: Normal range of motion.      Right lower leg: No edema.      Left lower leg: No edema.   Lymphadenopathy:      Cervical: No cervical adenopathy.   Skin:     General: Skin is warm.      Coloration: Skin is not jaundiced.      Findings: No rash.   Neurological:      General: No focal deficit present.      Mental Status: He is alert and oriented to person, place, and time.      Cranial Nerves: No cranial nerve deficit.      Deep Tendon Reflexes: Reflexes normal.   Psychiatric:         Mood and Affect: Mood normal.             Signed Electronically by: Sree Martin MD